# Patient Record
Sex: FEMALE | Race: WHITE | NOT HISPANIC OR LATINO | Employment: FULL TIME | URBAN - METROPOLITAN AREA
[De-identification: names, ages, dates, MRNs, and addresses within clinical notes are randomized per-mention and may not be internally consistent; named-entity substitution may affect disease eponyms.]

---

## 2018-02-14 ENCOUNTER — APPOINTMENT (OUTPATIENT)
Dept: GENERAL RADIOLOGY | Facility: HOSPITAL | Age: 58
End: 2018-02-14

## 2018-02-14 ENCOUNTER — HOSPITAL ENCOUNTER (INPATIENT)
Facility: HOSPITAL | Age: 58
LOS: 3 days | Discharge: HOME | End: 2018-02-17
Attending: EMERGENCY MEDICINE | Admitting: INTERNAL MEDICINE

## 2018-02-14 DIAGNOSIS — J40 BRONCHITIS: ICD-10-CM

## 2018-02-14 DIAGNOSIS — R06.2 WHEEZES: ICD-10-CM

## 2018-02-14 DIAGNOSIS — R09.02 HYPOXEMIA: ICD-10-CM

## 2018-02-14 DIAGNOSIS — J18.9 COMMUNITY ACQUIRED PNEUMONIA OF RIGHT LOWER LOBE OF LUNG: ICD-10-CM

## 2018-02-14 DIAGNOSIS — J44.1 CHRONIC OBSTRUCTIVE PULMONARY DISEASE WITH ACUTE EXACERBATION (HCC): ICD-10-CM

## 2018-02-14 DIAGNOSIS — J96.01 ACUTE RESPIRATORY FAILURE WITH HYPOXIA (HCC): ICD-10-CM

## 2018-02-14 DIAGNOSIS — R06.02 SHORTNESS OF BREATH: Primary | ICD-10-CM

## 2018-02-14 DIAGNOSIS — Z72.0 TOBACCO ABUSE: ICD-10-CM

## 2018-02-14 LAB
ALBUMIN SERPL-MCNC: 4.6 G/DL (ref 3.2–4.8)
ALBUMIN/GLOB SERPL: 1.4 G/DL (ref 1.5–2.5)
ALP SERPL-CCNC: 80 U/L (ref 25–100)
ALT SERPL W P-5'-P-CCNC: 33 U/L (ref 7–40)
ANION GAP SERPL CALCULATED.3IONS-SCNC: 7 MMOL/L (ref 3–11)
ARTERIAL PATENCY WRIST A: ABNORMAL
AST SERPL-CCNC: 27 U/L (ref 0–33)
ATMOSPHERIC PRESS: ABNORMAL MMHG
BASE EXCESS BLDA CALC-SCNC: 0.2 MMOL/L (ref 0–2)
BASOPHILS # BLD AUTO: 0.02 10*3/MM3 (ref 0–0.2)
BASOPHILS NFR BLD AUTO: 0.2 % (ref 0–1)
BDY SITE: ABNORMAL
BILIRUB SERPL-MCNC: 0.3 MG/DL (ref 0.3–1.2)
BUN BLD-MCNC: 11 MG/DL (ref 9–23)
BUN/CREAT SERPL: 13.8 (ref 7–25)
CALCIUM SPEC-SCNC: 9.8 MG/DL (ref 8.7–10.4)
CHLORIDE SERPL-SCNC: 102 MMOL/L (ref 99–109)
CO2 BLDA-SCNC: 24.1 MMOL/L (ref 22–33)
CO2 SERPL-SCNC: 24 MMOL/L (ref 20–31)
COHGB MFR BLD: 1.6 % (ref 0–2)
CREAT BLD-MCNC: 0.8 MG/DL (ref 0.6–1.3)
DEPRECATED RDW RBC AUTO: 53.2 FL (ref 37–54)
EOSINOPHIL # BLD AUTO: 0.04 10*3/MM3 (ref 0–0.3)
EOSINOPHIL NFR BLD AUTO: 0.4 % (ref 0–3)
ERYTHROCYTE [DISTWIDTH] IN BLOOD BY AUTOMATED COUNT: 15.5 % (ref 11.3–14.5)
FLUAV AG NPH QL: NEGATIVE
FLUAV SUBTYP SPEC NAA+PROBE: NOT DETECTED
FLUBV AG NPH QL IA: NEGATIVE
FLUBV RNA ISLT QL NAA+PROBE: NOT DETECTED
GFR SERPL CREATININE-BSD FRML MDRD: 74 ML/MIN/1.73
GLOBULIN UR ELPH-MCNC: 3.4 GM/DL
GLUCOSE BLD-MCNC: 121 MG/DL (ref 70–100)
GLUCOSE BLDC GLUCOMTR-MCNC: 253 MG/DL (ref 70–130)
HCO3 BLDA-SCNC: 23.2 MMOL/L (ref 20–26)
HCT VFR BLD AUTO: 42.8 % (ref 34.5–44)
HCT VFR BLD CALC: 42.3 %
HGB BLD-MCNC: 14.4 G/DL (ref 11.5–15.5)
HGB BLDA-MCNC: 13.8 G/DL (ref 14–18)
HOLD SPECIMEN: NORMAL
HOLD SPECIMEN: NORMAL
HOROWITZ INDEX BLD+IHG-RTO: 21 %
IMM GRANULOCYTES # BLD: 0.05 10*3/MM3 (ref 0–0.03)
IMM GRANULOCYTES NFR BLD: 0.6 % (ref 0–0.6)
LYMPHOCYTES # BLD AUTO: 0.67 10*3/MM3 (ref 0.6–4.8)
LYMPHOCYTES NFR BLD AUTO: 7.5 % (ref 24–44)
MCH RBC QN AUTO: 31.6 PG (ref 27–31)
MCHC RBC AUTO-ENTMCNC: 33.6 G/DL (ref 32–36)
MCV RBC AUTO: 93.9 FL (ref 80–99)
METHGB BLD QL: 0.7 % (ref 0–1.5)
MODALITY: ABNORMAL
MONOCYTES # BLD AUTO: 0.74 10*3/MM3 (ref 0–1)
MONOCYTES NFR BLD AUTO: 8.3 % (ref 0–12)
NEUTROPHILS # BLD AUTO: 7.4 10*3/MM3 (ref 1.5–8.3)
NEUTROPHILS NFR BLD AUTO: 83 % (ref 41–71)
OXYHGB MFR BLDV: 87.4 % (ref 94–99)
PCO2 BLDA: 31.9 MM HG (ref 35–45)
PH BLDA: 7.47 PH UNITS (ref 7.35–7.45)
PLATELET # BLD AUTO: 163 10*3/MM3 (ref 150–450)
PMV BLD AUTO: 10.7 FL (ref 6–12)
PO2 BLDA: 53.1 MM HG (ref 83–108)
POTASSIUM BLD-SCNC: 4.3 MMOL/L (ref 3.5–5.5)
PROT SERPL-MCNC: 8 G/DL (ref 5.7–8.2)
RBC # BLD AUTO: 4.56 10*6/MM3 (ref 3.89–5.14)
SAO2 % BLDCOA: 87 %
SODIUM BLD-SCNC: 133 MMOL/L (ref 132–146)
WBC NRBC COR # BLD: 8.92 10*3/MM3 (ref 3.5–10.8)
WHOLE BLOOD HOLD SPECIMEN: NORMAL
WHOLE BLOOD HOLD SPECIMEN: NORMAL

## 2018-02-14 PROCEDURE — 94799 UNLISTED PULMONARY SVC/PX: CPT

## 2018-02-14 PROCEDURE — 99223 1ST HOSP IP/OBS HIGH 75: CPT | Performed by: INTERNAL MEDICINE

## 2018-02-14 PROCEDURE — 87804 INFLUENZA ASSAY W/OPTIC: CPT | Performed by: PHYSICIAN ASSISTANT

## 2018-02-14 PROCEDURE — 25010000002 METHYLPREDNISOLONE PER 125 MG: Performed by: PHYSICIAN ASSISTANT

## 2018-02-14 PROCEDURE — 80053 COMPREHEN METABOLIC PANEL: CPT | Performed by: EMERGENCY MEDICINE

## 2018-02-14 PROCEDURE — 25010000002 KETOROLAC TROMETHAMINE PER 15 MG: Performed by: PHYSICIAN ASSISTANT

## 2018-02-14 PROCEDURE — 82962 GLUCOSE BLOOD TEST: CPT

## 2018-02-14 PROCEDURE — 71045 X-RAY EXAM CHEST 1 VIEW: CPT

## 2018-02-14 PROCEDURE — 25010000002 ENOXAPARIN PER 10 MG: Performed by: INTERNAL MEDICINE

## 2018-02-14 PROCEDURE — 87502 INFLUENZA DNA AMP PROBE: CPT | Performed by: PHYSICIAN ASSISTANT

## 2018-02-14 PROCEDURE — 85025 COMPLETE CBC W/AUTO DIFF WBC: CPT | Performed by: EMERGENCY MEDICINE

## 2018-02-14 PROCEDURE — 94640 AIRWAY INHALATION TREATMENT: CPT

## 2018-02-14 PROCEDURE — 82805 BLOOD GASES W/O2 SATURATION: CPT | Performed by: PHYSICIAN ASSISTANT

## 2018-02-14 PROCEDURE — 94760 N-INVAS EAR/PLS OXIMETRY 1: CPT

## 2018-02-14 PROCEDURE — 99285 EMERGENCY DEPT VISIT HI MDM: CPT

## 2018-02-14 PROCEDURE — 36600 WITHDRAWAL OF ARTERIAL BLOOD: CPT | Performed by: PHYSICIAN ASSISTANT

## 2018-02-14 RX ORDER — KETOROLAC TROMETHAMINE 30 MG/ML
15 INJECTION, SOLUTION INTRAMUSCULAR; INTRAVENOUS ONCE
Status: COMPLETED | OUTPATIENT
Start: 2018-02-14 | End: 2018-02-14

## 2018-02-14 RX ORDER — IPRATROPIUM BROMIDE AND ALBUTEROL SULFATE 2.5; .5 MG/3ML; MG/3ML
3 SOLUTION RESPIRATORY (INHALATION) EVERY 4 HOURS PRN
Status: DISCONTINUED | OUTPATIENT
Start: 2018-02-14 | End: 2018-02-17 | Stop reason: HOSPADM

## 2018-02-14 RX ORDER — IPRATROPIUM BROMIDE AND ALBUTEROL SULFATE 2.5; .5 MG/3ML; MG/3ML
3 SOLUTION RESPIRATORY (INHALATION)
Status: DISCONTINUED | OUTPATIENT
Start: 2018-02-14 | End: 2018-02-17 | Stop reason: HOSPADM

## 2018-02-14 RX ORDER — BENZONATATE 100 MG/1
200 CAPSULE ORAL 3 TIMES DAILY PRN
Status: DISCONTINUED | OUTPATIENT
Start: 2018-02-14 | End: 2018-02-17 | Stop reason: HOSPADM

## 2018-02-14 RX ORDER — ALBUTEROL SULFATE 2.5 MG/3ML
2.5 SOLUTION RESPIRATORY (INHALATION) ONCE
Status: COMPLETED | OUTPATIENT
Start: 2018-02-14 | End: 2018-02-14

## 2018-02-14 RX ORDER — METHYLPREDNISOLONE SODIUM SUCCINATE 125 MG/2ML
125 INJECTION, POWDER, LYOPHILIZED, FOR SOLUTION INTRAMUSCULAR; INTRAVENOUS ONCE
Status: COMPLETED | OUTPATIENT
Start: 2018-02-14 | End: 2018-02-14

## 2018-02-14 RX ORDER — SODIUM CHLORIDE 0.9 % (FLUSH) 0.9 %
10 SYRINGE (ML) INJECTION AS NEEDED
Status: DISCONTINUED | OUTPATIENT
Start: 2018-02-14 | End: 2018-02-17 | Stop reason: HOSPADM

## 2018-02-14 RX ORDER — ACETAMINOPHEN 325 MG/1
650 TABLET ORAL EVERY 6 HOURS PRN
Status: DISCONTINUED | OUTPATIENT
Start: 2018-02-14 | End: 2018-02-17 | Stop reason: HOSPADM

## 2018-02-14 RX ORDER — METHYLPREDNISOLONE SODIUM SUCCINATE 40 MG/ML
60 INJECTION, POWDER, LYOPHILIZED, FOR SOLUTION INTRAMUSCULAR; INTRAVENOUS EVERY 12 HOURS
Status: DISCONTINUED | OUTPATIENT
Start: 2018-02-15 | End: 2018-02-16

## 2018-02-14 RX ORDER — SODIUM CHLORIDE 0.9 % (FLUSH) 0.9 %
1-10 SYRINGE (ML) INJECTION AS NEEDED
Status: DISCONTINUED | OUTPATIENT
Start: 2018-02-14 | End: 2018-02-17 | Stop reason: HOSPADM

## 2018-02-14 RX ORDER — IPRATROPIUM BROMIDE AND ALBUTEROL SULFATE 2.5; .5 MG/3ML; MG/3ML
3 SOLUTION RESPIRATORY (INHALATION) ONCE
Status: COMPLETED | OUTPATIENT
Start: 2018-02-14 | End: 2018-02-14

## 2018-02-14 RX ADMIN — IPRATROPIUM BROMIDE AND ALBUTEROL SULFATE 3 ML: .5; 3 SOLUTION RESPIRATORY (INHALATION) at 19:26

## 2018-02-14 RX ADMIN — IPRATROPIUM BROMIDE AND ALBUTEROL SULFATE 3 ML: .5; 3 SOLUTION RESPIRATORY (INHALATION) at 09:34

## 2018-02-14 RX ADMIN — ALBUTEROL SULFATE 2.5 MG: 2.5 SOLUTION RESPIRATORY (INHALATION) at 10:13

## 2018-02-14 RX ADMIN — BENZONATATE 200 MG: 100 CAPSULE, LIQUID FILLED ORAL at 23:08

## 2018-02-14 RX ADMIN — METHYLPREDNISOLONE SODIUM SUCCINATE 60 MG: 40 INJECTION, POWDER, FOR SOLUTION INTRAMUSCULAR; INTRAVENOUS at 23:08

## 2018-02-14 RX ADMIN — ACETAMINOPHEN 650 MG: 325 TABLET, FILM COATED ORAL at 17:09

## 2018-02-14 RX ADMIN — IPRATROPIUM BROMIDE AND ALBUTEROL SULFATE 3 ML: .5; 3 SOLUTION RESPIRATORY (INHALATION) at 15:05

## 2018-02-14 RX ADMIN — ENOXAPARIN SODIUM 40 MG: 40 INJECTION SUBCUTANEOUS at 14:42

## 2018-02-14 RX ADMIN — ACETAMINOPHEN 650 MG: 325 TABLET, FILM COATED ORAL at 23:08

## 2018-02-14 RX ADMIN — KETOROLAC TROMETHAMINE 15 MG: 30 INJECTION, SOLUTION INTRAMUSCULAR at 11:26

## 2018-02-14 RX ADMIN — METHYLPREDNISOLONE SODIUM SUCCINATE 125 MG: 125 INJECTION, POWDER, FOR SOLUTION INTRAMUSCULAR; INTRAVENOUS at 10:25

## 2018-02-14 NOTE — PAYOR COMM NOTE
"Anita Reyes (57 y.o. Female)     Date of Birth Social Security Number Address Home Phone MRN    1960  [address] [phone] [id number]    Gnosticism Marital Status          None Single       Admission Date Admission Type Admitting Provider Attending Provider Department, Room/Bed    18 Emergency Calixto Griffith MD Opii, [place], MD [facility] 3E, S337/1    Discharge Date Discharge Disposition Discharge Destination                      Attending Provider: Calixto Griffith MD     Allergies:  Sulfa Antibiotics    Isolation:  None   Infection:  None   Code Status:  FULL    Ht:  160 cm (63\")   Wt:  72.1 kg (159 lb)    Admission Cmt:  None   Principal Problem:  Acute respiratory failure with hypoxia [J96.01]                 Active Insurance as of 2018     Primary Coverage     Payor Plan Insurance Group Employer/Plan Group    [employer] [id number]     Payor Plan Address Payor Plan Phone Number Effective From Effective To    [address] [phone] 2015     [place] [address]       Subscriber Name Subscriber Birth Date Member ID       ANITA REYES 1960 [id number]                 Emergency Contacts      (Rel.) Home Phone Work Phone Mobile Phone    Valerie Reyes (Daughter) [phone] -- --            Insurance Information                [facility] [employer] Phone: [phone]    Subscriber: Anita Reyes Subscriber#: [id number]    Group#: [id number] Precert#:              History & Physical      Calixto Griffith MD at 2018 12:56 PM              [facility]  HISTORY AND PHYSICAL    Patient Name: Anita Reyes  : 1960  MRN: [id number]  Primary Care Physician: No Known Provider    Subjective   Subjective     Chief Complaint: General weakness , aches and SOA    HPI:  Anita Reyes is a 57 y.o. female with her " extensive smoking history, presents with 1 week of generalized body aches, weakness, nausea with no vomiting, and progressive shortness of breath.  Patient works at the [employer] and she mentions that she has been in contact with multiple sick patients.  On arrival here patient is hemodynamically stable, oxygen levels low ABG done that reveals hypoxia.  Patient is status post duo nebs and steroids with marginal improvement, as such Hospital medicine was consulted for admission.  On evaluation patient was working hard to breathe, was restless, did endorse a cough and shortness of air, nausea but no vomiting.    Review of Systems   Gen- No fevers, chills  CV- No chest pain, palpitations  Resp- No cough, dyspnea  GI- +nausea no V/D, abd pain    Otherwise 10-system ROS reviewed and is negative except as mentioned in the HPI.    Personal History     History reviewed. No pertinent past medical history.    Past Surgical History:   Procedure Laterality Date   • TUBAL ABDOMINAL LIGATION         Family History: Lung cancer on further    Social History:  reports that she has been smoking Cigarettes.  She has been smoking about 1.00 pack per day. She does not have any smokeless tobacco history on file. She reports that she drinks alcohol. She reports that she uses illicit drugs.  Social History     Social History Narrative   • No narrative on file       Medications:  No prescriptions prior to admission.       Allergies   Allergen Reactions   • Sulfa Antibiotics Itching       Objective   Objective     Vital Signs:   Temp:  [98.1 °F (36.7 °C)-99.3 °F (37.4 °C)] 99.3 °F (37.4 °C)  Heart Rate:  [] 98  Resp:  [22] 22  BP: (110-141)/(51-82) 121/51        Physical Exam   Constitutional: No acute distress, awake, alert, uncomdortable  Eyes: PERRLA, sclerae anicteric, no conjunctival injection  HENT: NCAT, mucous membranes moist  Neck: Supple, no thyromegaly, no lymphadenopathy, trachea midline  Respiratory: labored respirations, coarse  BS, diffuse, exp wheezes   Cardiovascular: RRR, no murmurs, rubs, or gallops, palpable pedal pulses bilaterally  Gastrointestinal: Positive bowel sounds, soft, nontender, nondistended  Musculoskeletal: No bilateral ankle edema, no clubbing or cyanosis to extremities  Psychiatric: Appropriate affect, cooperative  Neurologic: Oriented x 3, strength symmetric in all extremities, Cranial Nerves grossly intact to confrontation, speech clear  Skin: No rashes    Results Reviewed:  I have personally reviewed current lab, radiology, and data and agree.      Results from last 7 days  Lab Units 02/14/18  0920   WBC 10*3/mm3 8.92   HEMOGLOBIN g/dL 14.4   HEMATOCRIT % 42.8   PLATELETS 10*3/mm3 163       Results from last 7 days  Lab Units 02/14/18  0920   SODIUM mmol/L 133   POTASSIUM mmol/L 4.3   CHLORIDE mmol/L 102   CO2 mmol/L 24.0   BUN mg/dL 11   CREATININE mg/dL 0.80   GLUCOSE mg/dL 121*   CALCIUM mg/dL 9.8   ALT (SGPT) U/L 33   AST (SGOT) U/L 27     Estimated Creatinine Clearance: 73.5 mL/min (by C-G formula based on Cr of 0.8).  Brief Urine Lab Results     None        No results found for: BNP  pH, Arterial   Date Value Ref Range Status   02/14/2018 7.469 (H) 7.350 - 7.450 pH units Final     Imaging Results (last 24 hours)     Procedure Component Value Units Date/Time    XR Chest 1 View [[id number]] Collected:  02/14/18 1048     Updated:  02/14/18 1155    Narrative:       EXAMINATION: XR CHEST 1 VW-      INDICATION: Simple sepsis triage protocol.      COMPARISON: None.     FINDINGS:   1. There is ill-defined airspace opacity right lung base. Although it is  not densely consolidated but appears acute and active. There is  increased density and prominence to the markings in the right hilum.  2. The left lung is clear. The heart is normal. There is no free fluid.            Impression:       Airspace opacity right hilum perihilar area and right lung  base, likely acute inflammatory process. Otherwise, the left lung is  clear  and there is no free fluid.     D:  02/14/2018  E:  02/14/2018     This report was finalized on 2/14/2018 11:53 AM by Dr. Raji Chamberlain MD.                Assessment/Plan   Assessment / Plan     Hospital Problem List     * (Principal)Acute respiratory failure with hypoxia    Chronic obstructive pulmonary disease with acute exacerbation        57-year-old female with extensive smoking history, presents with 1 week of generalized body aches, nausea no vomiting and increasing shortness of breath.  Admitted for suspected COPD exacerbation    Assessment & Plan:  -Acute respiratory failure with hypoxia etiology unknown however patient smoking history suspect underlying COPD with exacerbation, continue steroids, we'll, O2 supplementation wean as able.  -Generalized aches with malaise suspected flu, antigen negative follow-up influenza PCR    DVT prophylaxis: Lovenox    CODE STATUS:  No Order    Admission Status:  I believe this patient meets INPATIENT status due to the need for care which can only be reasonably provided in an hospital setting such as aggressive/expedited ancillary services and/or consultation services, the necessity for IV medications, close physician monitoring and/or the possible need for procedures.  In such, I feel patient’s risk for adverse outcomes and need for care warrant INPATIENT evaluation and predict the patient’s care encounter to likely last beyond 2 midnights.    Electronically signed by Calixto Griffith MD, 02/14/18, 12:56 PM.         Electronically signed by Calixto Griffith MD at 2/14/2018  2:31 PM           Emergency Department Notes      MARC Ortiz at 2/14/2018  9:10 AM          Subjective   HPI Comments: 57-year-old female presents to the emergency department with complaints of shortness of breath, wheezing and cough.  Symptoms began 4 days ago.  She has had a subjective fever.  No nausea or vomiting.  She notes some dark urine.  No urinary symptoms.  The patient smokes a half  "pack cigarettes daily.  She occasionally drinks alcohol.  No drug use.  No known health issues.  She has no PCP.    Patient is a 57 y.o. female presenting with URI.   History provided by:  Patient  URI   Presenting symptoms: congestion, cough, fatigue and fever (subjective)    Presenting symptoms: no ear pain, no rhinorrhea and no sore throat    Severity:  Moderate  Onset quality:  Gradual  Duration:  4 days  Timing:  Constant  Progression:  Worsening  Chronicity:  New  Relieved by:  Nothing  Worsened by:  Nothing  Associated symptoms: wheezing    Associated symptoms: no arthralgias, no headaches and no myalgias    Risk factors: no chronic respiratory disease, no recent travel and no sick contacts        Review of Systems   Constitutional: Positive for chills, fatigue and fever (subjective).   HENT: Positive for congestion. Negative for ear pain, nosebleeds, rhinorrhea and sore throat.    Eyes: Negative for pain, discharge and visual disturbance.   Respiratory: Positive for cough, shortness of breath and wheezing.    Cardiovascular: Positive for chest pain (\"soreness with coughing\"). Negative for palpitations and leg swelling.   Gastrointestinal: Negative for abdominal pain, blood in stool, diarrhea, nausea and vomiting.   Endocrine: Negative.    Genitourinary: Negative for dysuria, hematuria and urgency.   Musculoskeletal: Negative for arthralgias, back pain and myalgias.   Skin: Negative for pallor and rash.   Allergic/Immunologic: Negative for immunocompromised state.   Neurological: Negative for dizziness, speech difficulty, weakness and headaches.   Hematological: Negative for adenopathy. Does not bruise/bleed easily.   Psychiatric/Behavioral: Negative.        History reviewed. No pertinent past medical history.    Allergies   Allergen Reactions   • Sulfa Antibiotics Itching       Past Surgical History:   Procedure Laterality Date   • TUBAL ABDOMINAL LIGATION         History reviewed. No pertinent family " history.    Social History     Social History   • Marital status: Single     Spouse name: N/A   • Number of children: N/A   • Years of education: N/A     Social History Main Topics   • Smoking status: Current Every Day Smoker     Packs/day: 1.00     Types: Cigarettes   • Smokeless tobacco: None   • Alcohol use Yes   • Drug use: Yes   • Sexual activity: Defer     Other Topics Concern   • None     Social History Narrative   • None           Objective   Physical Exam   Constitutional: She is oriented to person, place, and time. She appears well-developed and well-nourished. She appears distressed (appears to feel bad).   HENT:   Head: Normocephalic and atraumatic.   Nose: Nose normal.   Mouth/Throat: Oropharynx is clear and moist.   Eyes: Conjunctivae and EOM are normal. Pupils are equal, round, and reactive to light. No scleral icterus.   Neck: Normal range of motion. Neck supple.   Cardiovascular: Regular rhythm and normal heart sounds.    No murmur heard.  Mildly tachycardic   Pulmonary/Chest: She is in respiratory distress. She has wheezes. She has no rales. She exhibits no tenderness.   Abdominal: Soft. Bowel sounds are normal. There is no tenderness.   Musculoskeletal: Normal range of motion. She exhibits no edema or tenderness.   Neurological: She is alert and oriented to person, place, and time.   Skin: Skin is warm and dry. No rash noted. She is not diaphoretic.   Psychiatric: She has a normal mood and affect.   Nursing note and vitals reviewed.      Procedures        ED Course  ED Course    12:21 PM  The patient has had little to no relief of her shortness of breath and wheezing after 2 neb treatments and a dose of Solu-Medrol.  Chest x-ray shows some inflammatory changes but no consolidation is seen by the radiologist.  Influenza screen was negative.  A PCR flu test has been collected as well and is pending.  A blood gas collected after her second neb treatment shows her to be hypoxic with a PO2 of 53, PCO2  of 31 and a pH of 7.46.  White count is normal at 8.9 normal H&H.  Normal chemistries.  On last recheck, the patient is still wheezing quite a bit.  Her O2 sats with 2 L is 92%.  I have paged the hospitalist for admission.  Recent Results (from the past 24 hour(s))   Influenza Antigen, Rapid - Swab, Nasopharynx    Collection Time: 02/14/18  9:12 AM   Result Value Ref Range    Influenza A Ag, EIA Negative Negative    Influenza B Ag, EIA Negative Negative   Comprehensive Metabolic Panel    Collection Time: 02/14/18  9:20 AM   Result Value Ref Range    Glucose 121 (H) 70 - 100 mg/dL    BUN 11 9 - 23 mg/dL    Creatinine 0.80 0.60 - 1.30 mg/dL    Sodium 133 132 - 146 mmol/L    Potassium 4.3 3.5 - 5.5 mmol/L    Chloride 102 99 - 109 mmol/L    CO2 24.0 20.0 - 31.0 mmol/L    Calcium 9.8 8.7 - 10.4 mg/dL    Total Protein 8.0 5.7 - 8.2 g/dL    Albumin 4.60 3.20 - 4.80 g/dL    ALT (SGPT) 33 7 - 40 U/L    AST (SGOT) 27 0 - 33 U/L    Alkaline Phosphatase 80 25 - 100 U/L    Total Bilirubin 0.3 0.3 - 1.2 mg/dL    eGFR Non African Amer 74 >60 mL/min/1.73    Globulin 3.4 gm/dL    A/G Ratio 1.4 (L) 1.5 - 2.5 g/dL    BUN/Creatinine Ratio 13.8 7.0 - 25.0    Anion Gap 7.0 3.0 - 11.0 mmol/L   CBC Auto Differential    Collection Time: 02/14/18  9:20 AM   Result Value Ref Range    WBC 8.92 3.50 - 10.80 10*3/mm3    RBC 4.56 3.89 - 5.14 10*6/mm3    Hemoglobin 14.4 11.5 - 15.5 g/dL    Hematocrit 42.8 34.5 - 44.0 %    MCV 93.9 80.0 - 99.0 fL    MCH 31.6 (H) 27.0 - 31.0 pg    MCHC 33.6 32.0 - 36.0 g/dL    RDW 15.5 (H) 11.3 - 14.5 %    RDW-SD 53.2 37.0 - 54.0 fl    MPV 10.7 6.0 - 12.0 fL    Platelets 163 150 - 450 10*3/mm3    Neutrophil % 83.0 (H) 41.0 - 71.0 %    Lymphocyte % 7.5 (L) 24.0 - 44.0 %    Monocyte % 8.3 0.0 - 12.0 %    Eosinophil % 0.4 0.0 - 3.0 %    Basophil % 0.2 0.0 - 1.0 %    Immature Grans % 0.6 0.0 - 0.6 %    Neutrophils, Absolute 7.40 1.50 - 8.30 10*3/mm3    Lymphocytes, Absolute 0.67 0.60 - 4.80 10*3/mm3    Monocytes,  Absolute 0.74 0.00 - 1.00 10*3/mm3    Eosinophils, Absolute 0.04 0.00 - 0.30 10*3/mm3    Basophils, Absolute 0.02 0.00 - 0.20 10*3/mm3    Immature Grans, Absolute 0.05 (H) 0.00 - 0.03 10*3/mm3   Light Blue Top    Collection Time: 02/14/18  9:20 AM   Result Value Ref Range    Extra Tube hold for add-on    Green Top (Gel)    Collection Time: 02/14/18  9:20 AM   Result Value Ref Range    Extra Tube Hold for add-ons.    Lavender Top    Collection Time: 02/14/18  9:20 AM   Result Value Ref Range    Extra Tube hold for add-on    Gold Top - SST    Collection Time: 02/14/18  9:20 AM   Result Value Ref Range    Extra Tube Hold for add-ons.    Blood Gas, Arterial    Collection Time: 02/14/18 11:11 AM   Result Value Ref Range    Site Arterial: right radial     Erwin's Test N/A     pH, Arterial 7.469 (H) 7.350 - 7.450 pH units    pCO2, Arterial 31.9 (L) 35.0 - 45.0 mm Hg    pO2, Arterial 53.1 (L) 83.0 - 108.0 mm Hg    HCO3, Arterial 23.2 20.0 - 26.0 mmol/L    Base Excess, Arterial 0.2 0.0 - 2.0 mmol/L    O2 Saturation, Arterial 87.0 %    Hemoglobin, Blood Gas 13.8 (L) 14 - 18 g/dL    Hematocrit, Blood Gas 42.3 %    Oxyhemoglobin 87.4 (L) 94 - 99 %    Methemoglobin 0.70 0.00 - 1.50 %    Carboxyhemoglobin 1.6 0 - 2 %    CO2 Content 24.1 22 - 33    Barometric Pressure for Blood Gas  mmHg    Modality Room Air     FIO2 21 %     Note: In addition to lab results from this visit, the labs listed above may include labs taken at another facility or during a different encounter within the last 24 hours. Please correlate lab times with ED admission and discharge times for further clarification of the services performed during this visit.    XR Chest 1 View   Final Result   Airspace opacity right hilum perihilar area and right lung   base, likely acute inflammatory process. Otherwise, the left lung is   clear and there is no free fluid.       D:  02/14/2018   E:  02/14/2018       This report was finalized on 2/14/2018 11:53 AM by Dr. Alegria  MD Bulmaro.            Vitals:    02/14/18 1030 02/14/18 1100 02/14/18 1130 02/14/18 1200   BP: 121/61 141/68  110/57   BP Location:       Patient Position:       Pulse: 105 111 107 105   Resp:       Temp:       TempSrc:       SpO2: 93% 90% 93% 93%   Weight:       Height:         Medications   sodium chloride 0.9 % flush 10 mL (not administered)   ipratropium-albuterol (DUO-NEB) nebulizer solution 3 mL (3 mL Nebulization Given 2/14/18 0934)   albuterol (PROVENTIL) nebulizer solution 0.083% 2.5 mg/3mL (2.5 mg Nebulization Given 2/14/18 1013)   methylPREDNISolone sodium succinate (SOLU-Medrol) injection 125 mg (125 mg Intravenous Given 2/14/18 1025)   ketorolac (TORADOL) injection 15 mg (15 mg Intravenous Given 2/14/18 1126)     ECG/EMG Results (last 24 hours)     ** No results found for the last 24 hours. **                      [facility]    Final diagnoses:   Shortness of breath   Hypoxemia   Wheezes   Bronchitis            MARC Ortiz  02/14/18 1221       Electronically signed by MARC Ortiz at 2/14/2018 12:21 PM          Vital Signs (last 24 hours)       02/13 0700  -  02/14 0659 02/14 0700  -  02/14 1506   Most Recent    Temp (°F)   98.1 -  100.1     100.1 (37.8)    Heart Rate   84 -  120     84    Resp   20 -  22     20    BP   110/57 -  141/68     127/61    SpO2 (%)   90 -  95     93          Intake & Output (last day)     None        Lines, Drains & Airways    Active LDAs     Name:   Placement date:   Placement time:   Site:   Days:    Peripheral IV Line - Single Lumen 02/14/18 0909  20 gauge  02/14/18    0909      less than 1                Hospital Medications (all)       Dose Frequency Start End    albuterol (PROVENTIL) nebulizer solution 0.083% 2.5 mg/3mL 2.5 mg Once 2/14/2018 2/14/2018    Sig - Route: Take 2.5 mg by nebulization 1 (One) Time. - Nebulization    benzonatate (TESSALON) capsule 200 mg 200 mg 3 Times Daily PRN 2/14/2018     Sig - Route: Take 2 capsules by mouth 3 (Three) Times a  Day As Needed for Cough. - Oral    enoxaparin (LOVENOX) syringe 40 mg 40 mg Every 24 Hours 2/14/2018     Sig - Route: Inject 0.4 mL under the skin Daily. - Subcutaneous    ipratropium-albuterol (DUO-NEB) nebulizer solution 3 mL 3 mL Once 2/14/2018 2/14/2018    Sig - Route: Take 3 mL by nebulization 1 (One) Time. - Nebulization    ipratropium-albuterol (DUO-NEB) nebulizer solution 3 mL 3 mL Every 4 Hours - RT 2/14/2018     Sig - Route: Take 3 mL by nebulization Every 4 (Four) Hours. - Nebulization    ipratropium-albuterol (DUO-NEB) nebulizer solution 3 mL 3 mL Every 4 Hours PRN 2/14/2018     Sig - Route: Take 3 mL by nebulization Every 4 (Four) Hours As Needed for Shortness of Air. - Nebulization    ketorolac (TORADOL) injection 15 mg 15 mg Once 2/14/2018 2/14/2018    Sig - Route: Infuse 15 mg into a venous catheter 1 (One) Time. - Intravenous    methylPREDNISolone sodium succinate (SOLU-Medrol) injection 125 mg 125 mg Once 2/14/2018 2/14/2018    Sig - Route: Infuse 2 mL into a venous catheter 1 (One) Time. - Intravenous    methylPREDNISolone sodium succinate (SOLU-Medrol) injection 60 mg 60 mg Every 12 Hours 2/15/2018     Sig - Route: Infuse 1.5 mL into a venous catheter Every 12 (Twelve) Hours. - Intravenous    sodium chloride 0.9 % flush 1-10 mL 1-10 mL As Needed 2/14/2018     Sig - Route: Infuse 1-10 mL into a venous catheter As Needed for Line Care. - Intravenous    sodium chloride 0.9 % flush 10 mL 10 mL As Needed 2/14/2018     Sig - Route: Infuse 10 mL into a venous catheter As Needed for Line Care. - Intravenous    Cosign for Ordering: Required by Deepak Lim MD          Blood Administration Record     None        Lab Results (all)     Procedure Component Value Units Date/Time    CBC & Differential [[id number]] Collected:  02/14/18 0920    Specimen:  Blood Updated:  02/14/18 0926    Narrative:       The following orders were created for panel order CBC & Differential.  Procedure                                Abnormality         Status                     ---------                               -----------         ------                     CBC Auto Differential[[id number]]        Abnormal            Final result                 Please view results for these tests on the individual orders.    CBC Auto Differential [[id number]]  (Abnormal) Collected:  02/14/18 0920    Specimen:  Blood Updated:  02/14/18 0926     WBC 8.92 10*3/mm3      RBC 4.56 10*6/mm3      Hemoglobin 14.4 g/dL      Hematocrit 42.8 %      MCV 93.9 fL      MCH 31.6 (H) pg      MCHC 33.6 g/dL      RDW 15.5 (H) %      RDW-SD 53.2 fl      MPV 10.7 fL      Platelets 163 10*3/mm3      Neutrophil % 83.0 (H) %      Lymphocyte % 7.5 (L) %      Monocyte % 8.3 %      Eosinophil % 0.4 %      Basophil % 0.2 %      Immature Grans % 0.6 %      Neutrophils, Absolute 7.40 10*3/mm3      Lymphocytes, Absolute 0.67 10*3/mm3      Monocytes, Absolute 0.74 10*3/mm3      Eosinophils, Absolute 0.04 10*3/mm3      Basophils, Absolute 0.02 10*3/mm3      Immature Grans, Absolute 0.05 (H) 10*3/mm3     Influenza Antigen, Rapid - Swab, Nasopharynx [[id number]]  (Normal) Collected:  02/14/18 0912    Specimen:  Swab from Nasopharynx Updated:  02/14/18 0933     Influenza A Ag, EIA Negative     Influenza B Ag, EIA Negative    Comprehensive Metabolic Panel [[id number]]  (Abnormal) Collected:  02/14/18 0920    Specimen:  Blood Updated:  02/14/18 0945     Glucose 121 (H) mg/dL      BUN 11 mg/dL      Creatinine 0.80 mg/dL      Sodium 133 mmol/L      Potassium 4.3 mmol/L      Chloride 102 mmol/L      CO2 24.0 mmol/L      Calcium 9.8 mg/dL      Total Protein 8.0 g/dL      Albumin 4.60 g/dL      ALT (SGPT) 33 U/L      AST (SGOT) 27 U/L      Alkaline Phosphatase 80 U/L      Total Bilirubin 0.3 mg/dL      eGFR Non African Amer 74 mL/min/1.73      Globulin 3.4 gm/dL      A/G Ratio 1.4 (L) g/dL      BUN/Creatinine Ratio 13.8     Anion Gap 7.0 mmol/L     Narrative:       [employer]  Guidelines    Stage     Description        GFR  1         Normal or High     90+  2         Mild decrease      60-89  3         Moderate decrease  30-59  4         Severe decrease    15-29  5         Kidney failure     <15    [facility] [[id number]] Collected:  02/14/18 0920    Specimen:  Blood Updated:  02/14/18 1031    Narrative:       The following orders were created for panel order [facility].  Procedure                               Abnormality         Status                     ---------                               -----------         ------                     Light Blue Top[[id number]]                                   Final result               Green Top (Gel)[[id number]]                                  Final result               Lavender Top[[id number]]                                     Final result               Gold Top - SST[[id number]]                                   Final result               Green Top (No Gel)[[id number]]                                                            Please view results for these tests on the individual orders.    Light Blue Top [[id number]] Collected:  02/14/18 0920    Specimen:  Blood Updated:  02/14/18 1031     Extra Tube hold for add-on      Auto resulted       Green Top (Gel) [[id number]] Collected:  02/14/18 0920    Specimen:  Blood Updated:  02/14/18 1031     Extra Tube Hold for add-ons.      Auto resulted.       Lavender Top [[id number]] Collected:  02/14/18 0920    Specimen:  Blood Updated:  02/14/18 1031     Extra Tube hold for add-on      Auto resulted       Gold Top - SST [[id number]] Collected:  02/14/18 0920    Specimen:  Blood Updated:  02/14/18 1031     Extra Tube Hold for add-ons.      Auto resulted.       Blood Gas, Arterial [[id number]]  (Abnormal) Collected:  02/14/18 1111    Specimen:  Arterial Blood Updated:  02/14/18 1123     Site Arterial: right radial     Erwin's Test N/A     pH, Arterial 7.469 (H) pH units      pCO2, Arterial 31.9 (L) mm Hg       pO2, Arterial 53.1 (L) mm Hg      HCO3, Arterial 23.2 mmol/L      Base Excess, Arterial 0.2 mmol/L      O2 Saturation, Arterial 87.0 %      Hemoglobin, Blood Gas 13.8 (L) g/dL      Hematocrit, Blood Gas 42.3 %      Oxyhemoglobin 87.4 (L) %      Methemoglobin 0.70 %      Carboxyhemoglobin 1.6 %      CO2 Content 24.1     Barometric Pressure for Blood Gas -- mmHg       N/A        Modality Room Air     FIO2 21 %     Influenza A & B, RT PCR - Swab, Nasopharynx [[id number]]  (Normal) Collected:  02/14/18 1128    Specimen:  Swab from Nasopharynx Updated:  02/14/18 1320     Influenza A PCR Not Detected     Influenza B PCR Not Detected    POC Glucose Once [[id number]]  (Abnormal) Collected:  02/14/18 1454    Specimen:  Blood Updated:  02/14/18 1458     Glucose 253 (H) mg/dL     Narrative:       Meter: [id number] : [id number] Rai          Imaging Results (all)     Procedure Component Value Units Date/Time    XR Chest 1 View [[id number]] Collected:  02/14/18 1048     Updated:  02/14/18 1155    Narrative:       EXAMINATION: XR CHEST 1 VW-      INDICATION: Simple sepsis triage protocol.      COMPARISON: None.     FINDINGS:   1. There is ill-defined airspace opacity right lung base. Although it is  not densely consolidated but appears acute and active. There is  increased density and prominence to the markings in the right hilum.  2. The left lung is clear. The heart is normal. There is no free fluid.            Impression:       Airspace opacity right hilum perihilar area and right lung  base, likely acute inflammatory process. Otherwise, the left lung is  clear and there is no free fluid.     D:  02/14/2018  E:  02/14/2018     This report was finalized on 2/14/2018 11:53 AM by Dr. Raji Chamberlain MD.             ECG/EMG Results (all)     None        Operative/Procedure Notes (all)     No notes of this type exist for this encounter.        Physician Progress Notes (all)     No notes of this type exist for this encounter.         Consult Notes (all)     No notes of this type exist for this encounter.

## 2018-02-14 NOTE — PLAN OF CARE
Problem: Respiratory Insufficiency (Adult)  Goal: Identify Related Risk Factors and Signs and Symptoms   02/14/18 1547   Respiratory Insufficiency   Related Risk Factors (Respiratory Insufficiency) smoking;knowledge deficit   Signs and Symptoms (Respiratory Insufficiency) abnormal ABGs;abnormal breath sounds;cough (productive/ineffective);diaphoresis;dyspnea;shortness of breath     Goal: Acid/Base Balance  Outcome: Ongoing (interventions implemented as appropriate)    Goal: Effective Ventilation  Outcome: Ongoing (interventions implemented as appropriate)

## 2018-02-14 NOTE — H&P
[facility] Medicine Services  HISTORY AND PHYSICAL    Patient Name: Micaela Reyes  : 1960  MRN: [id number]  Primary Care Physician: No Known Provider    Subjective   Subjective     Chief Complaint: General weakness , aches and SOA    HPI:  Micaela Reyes is a 57 y.o. female with her extensive smoking history, presents with 1 week of generalized body aches, weakness, nausea with no vomiting, and progressive shortness of breath.  Patient works at the [employer] and she mentions that she has been in contact with multiple sick patients.  On arrival here patient is hemodynamically stable, oxygen levels low ABG done that reveals hypoxia.  Patient is status post duo nebs and steroids with marginal improvement, as such Hospital medicine was consulted for admission.  On evaluation patient was working hard to breathe, was restless, did endorse a cough and shortness of air, nausea but no vomiting.    Review of Systems   Gen- No fevers, chills  CV- No chest pain, palpitations  Resp- No cough, dyspnea  GI- +nausea no V/D, abd pain    Otherwise 10-system ROS reviewed and is negative except as mentioned in the HPI.    Personal History     History reviewed. No pertinent past medical history.    Past Surgical History:   Procedure Laterality Date   • TUBAL ABDOMINAL LIGATION         Family History: Lung cancer on further    Social History:  reports that she has been smoking Cigarettes.  She has been smoking about 1.00 pack per day. She does not have any smokeless tobacco history on file. She reports that she drinks alcohol. She reports that she uses illicit drugs.  Social History     Social History Narrative   • No narrative on file       Medications:  No prescriptions prior to admission.       Allergies   Allergen Reactions   • Sulfa Antibiotics Itching       Objective   Objective     Vital Signs:   Temp:  [98.1 °F (36.7 °C)-99.3 °F (37.4 °C)] 99.3 °F (37.4 °C)  Heart Rate:  [] 98  Resp:  [22] 22  BP:  (110-141)/(51-82) 121/51        Physical Exam   Constitutional: No acute distress, awake, alert, uncomdortable  Eyes: PERRLA, sclerae anicteric, no conjunctival injection  HENT: NCAT, mucous membranes moist  Neck: Supple, no thyromegaly, no lymphadenopathy, trachea midline  Respiratory: labored respirations, coarse BS, diffuse, exp wheezes   Cardiovascular: RRR, no murmurs, rubs, or gallops, palpable pedal pulses bilaterally  Gastrointestinal: Positive bowel sounds, soft, nontender, nondistended  Musculoskeletal: No bilateral ankle edema, no clubbing or cyanosis to extremities  Psychiatric: Appropriate affect, cooperative  Neurologic: Oriented x 3, strength symmetric in all extremities, Cranial Nerves grossly intact to confrontation, speech clear  Skin: No rashes    Results Reviewed:  I have personally reviewed current lab, radiology, and data and agree.      Results from last 7 days  Lab Units 02/14/18  0920   WBC 10*3/mm3 8.92   HEMOGLOBIN g/dL 14.4   HEMATOCRIT % 42.8   PLATELETS 10*3/mm3 163       Results from last 7 days  Lab Units 02/14/18  0920   SODIUM mmol/L 133   POTASSIUM mmol/L 4.3   CHLORIDE mmol/L 102   CO2 mmol/L 24.0   BUN mg/dL 11   CREATININE mg/dL 0.80   GLUCOSE mg/dL 121*   CALCIUM mg/dL 9.8   ALT (SGPT) U/L 33   AST (SGOT) U/L 27     Estimated Creatinine Clearance: 73.5 mL/min (by C-G formula based on Cr of 0.8).  Brief Urine Lab Results     None        No results found for: BNP  pH, Arterial   Date Value Ref Range Status   02/14/2018 7.469 (H) 7.350 - 7.450 pH units Final     Imaging Results (last 24 hours)     Procedure Component Value Units Date/Time    XR Chest 1 View [[id number]] Collected:  02/14/18 1048     Updated:  02/14/18 1155    Narrative:       EXAMINATION: XR CHEST 1 VW-      INDICATION: Simple sepsis triage protocol.      COMPARISON: None.     FINDINGS:   1. There is ill-defined airspace opacity right lung base. Although it is  not densely consolidated but appears acute and active.  There is  increased density and prominence to the markings in the right hilum.  2. The left lung is clear. The heart is normal. There is no free fluid.            Impression:       Airspace opacity right hilum perihilar area and right lung  base, likely acute inflammatory process. Otherwise, the left lung is  clear and there is no free fluid.     D:  02/14/2018  E:  02/14/2018     This report was finalized on 2/14/2018 11:53 AM by Dr. Raji Chamberlain MD.                Assessment/Plan   Assessment / Plan     Hospital Problem List     * (Principal)Acute respiratory failure with hypoxia    Chronic obstructive pulmonary disease with acute exacerbation        57-year-old female with extensive smoking history, presents with 1 week of generalized body aches, nausea no vomiting and increasing shortness of breath.  Admitted for suspected COPD exacerbation    Assessment & Plan:  -Acute respiratory failure with hypoxia etiology unknown however patient smoking history suspect underlying COPD with exacerbation, continue steroids, we'll, O2 supplementation wean as able.  -Generalized aches with malaise suspected flu, antigen negative follow-up influenza PCR    DVT prophylaxis: Lovenox    CODE STATUS:  No Order    Admission Status:  I believe this patient meets INPATIENT status due to the need for care which can only be reasonably provided in an hospital setting such as aggressive/expedited ancillary services and/or consultation services, the necessity for IV medications, close physician monitoring and/or the possible need for procedures.  In such, I feel patient’s risk for adverse outcomes and need for care warrant INPATIENT evaluation and predict the patient’s care encounter to likely last beyond 2 midnights.    Electronically signed by Calixto Griffith MD, 02/14/18, 12:56 PM.

## 2018-02-14 NOTE — ED PROVIDER NOTES
"Subjective   HPI Comments: 57-year-old female presents to the emergency department with complaints of shortness of breath, wheezing and cough.  Symptoms began 4 days ago.  She has had a subjective fever.  No nausea or vomiting.  She notes some dark urine.  No urinary symptoms.  The patient smokes a half pack cigarettes daily.  She occasionally drinks alcohol.  No drug use.  No known health issues.  She has no PCP.    Patient is a 57 y.o. female presenting with URI.   History provided by:  Patient  URI   Presenting symptoms: congestion, cough, fatigue and fever (subjective)    Presenting symptoms: no ear pain, no rhinorrhea and no sore throat    Severity:  Moderate  Onset quality:  Gradual  Duration:  4 days  Timing:  Constant  Progression:  Worsening  Chronicity:  New  Relieved by:  Nothing  Worsened by:  Nothing  Associated symptoms: wheezing    Associated symptoms: no arthralgias, no headaches and no myalgias    Risk factors: no chronic respiratory disease, no recent travel and no sick contacts        Review of Systems   Constitutional: Positive for chills, fatigue and fever (subjective).   HENT: Positive for congestion. Negative for ear pain, nosebleeds, rhinorrhea and sore throat.    Eyes: Negative for pain, discharge and visual disturbance.   Respiratory: Positive for cough, shortness of breath and wheezing.    Cardiovascular: Positive for chest pain (\"soreness with coughing\"). Negative for palpitations and leg swelling.   Gastrointestinal: Negative for abdominal pain, blood in stool, diarrhea, nausea and vomiting.   Endocrine: Negative.    Genitourinary: Negative for dysuria, hematuria and urgency.   Musculoskeletal: Negative for arthralgias, back pain and myalgias.   Skin: Negative for pallor and rash.   Allergic/Immunologic: Negative for immunocompromised state.   Neurological: Negative for dizziness, speech difficulty, weakness and headaches.   Hematological: Negative for adenopathy. Does not bruise/bleed " easily.   Psychiatric/Behavioral: Negative.        History reviewed. No pertinent past medical history.    Allergies   Allergen Reactions   • Sulfa Antibiotics Itching       Past Surgical History:   Procedure Laterality Date   • TUBAL ABDOMINAL LIGATION         History reviewed. No pertinent family history.    Social History     Social History   • Marital status: Single     Spouse name: N/A   • Number of children: N/A   • Years of education: N/A     Social History Main Topics   • Smoking status: Current Every Day Smoker     Packs/day: 1.00     Types: Cigarettes   • Smokeless tobacco: None   • Alcohol use Yes   • Drug use: Yes   • Sexual activity: Defer     Other Topics Concern   • None     Social History Narrative   • None           Objective   Physical Exam   Constitutional: She is oriented to person, place, and time. She appears well-developed and well-nourished. She appears distressed (appears to feel bad).   HENT:   Head: Normocephalic and atraumatic.   Nose: Nose normal.   Mouth/Throat: Oropharynx is clear and moist.   Eyes: Conjunctivae and EOM are normal. Pupils are equal, round, and reactive to light. No scleral icterus.   Neck: Normal range of motion. Neck supple.   Cardiovascular: Regular rhythm and normal heart sounds.    No murmur heard.  Mildly tachycardic   Pulmonary/Chest: She is in respiratory distress. She has wheezes. She has no rales. She exhibits no tenderness.   Abdominal: Soft. Bowel sounds are normal. There is no tenderness.   Musculoskeletal: Normal range of motion. She exhibits no edema or tenderness.   Neurological: She is alert and oriented to person, place, and time.   Skin: Skin is warm and dry. No rash noted. She is not diaphoretic.   Psychiatric: She has a normal mood and affect.   Nursing note and vitals reviewed.      Procedures         ED Course  ED Course    12:21 PM  The patient has had little to no relief of her shortness of breath and wheezing after 2 neb treatments and a dose  [facility].  Chest x-ray shows some inflammatory changes but no consolidation is seen by the radiologist.  Influenza screen was negative.  A PCR flu test has been collected as well and is pending.  A blood gas collected after her second neb treatment shows her to be hypoxic with a PO2 of 53, PCO2 of 31 and a pH of 7.46.  White count is normal at 8.9 normal H&H.  Normal chemistries.  On last recheck, the patient is still wheezing quite a bit.  Her O2 sats with 2 L is 92%.  I have paged the hospitalist for admission.  Recent Results (from the past 24 hour(s))   Influenza Antigen, Rapid - Swab, Nasopharynx    Collection Time: 02/14/18  9:12 AM   Result Value Ref Range    Influenza A Ag, EIA Negative Negative    Influenza B Ag, EIA Negative Negative   Comprehensive Metabolic Panel    Collection Time: 02/14/18  9:20 AM   Result Value Ref Range    Glucose 121 (H) 70 - 100 mg/dL    BUN 11 9 - 23 mg/dL    Creatinine 0.80 0.60 - 1.30 mg/dL    Sodium 133 132 - 146 mmol/L    Potassium 4.3 3.5 - 5.5 mmol/L    Chloride 102 99 - 109 mmol/L    CO2 24.0 20.0 - 31.0 mmol/L    Calcium 9.8 8.7 - 10.4 mg/dL    Total Protein 8.0 5.7 - 8.2 g/dL    Albumin 4.60 3.20 - 4.80 g/dL    ALT (SGPT) 33 7 - 40 U/L    AST (SGOT) 27 0 - 33 U/L    Alkaline Phosphatase 80 25 - 100 U/L    Total Bilirubin 0.3 0.3 - 1.2 mg/dL    eGFR Non African Amer 74 >60 mL/min/1.73    Globulin 3.4 gm/dL    A/G Ratio 1.4 (L) 1.5 - 2.5 g/dL    BUN/Creatinine Ratio 13.8 7.0 - 25.0    Anion Gap 7.0 3.0 - 11.0 mmol/L   CBC Auto Differential    Collection Time: 02/14/18  9:20 AM   Result Value Ref Range    WBC 8.92 3.50 - 10.80 10*3/mm3    RBC 4.56 3.89 - 5.14 10*6/mm3    Hemoglobin 14.4 11.5 - 15.5 g/dL    Hematocrit 42.8 34.5 - 44.0 %    MCV 93.9 80.0 - 99.0 fL    MCH 31.6 (H) 27.0 - 31.0 pg    MCHC 33.6 32.0 - 36.0 g/dL    RDW 15.5 (H) 11.3 - 14.5 %    RDW-SD 53.2 37.0 - 54.0 fl    MPV 10.7 6.0 - 12.0 fL    Platelets 163 150 - 450 10*3/mm3    Neutrophil % 83.0 (H)  41.0 - 71.0 %    Lymphocyte % 7.5 (L) 24.0 - 44.0 %    Monocyte % 8.3 0.0 - 12.0 %    Eosinophil % 0.4 0.0 - 3.0 %    Basophil % 0.2 0.0 - 1.0 %    Immature Grans % 0.6 0.0 - 0.6 %    Neutrophils, Absolute 7.40 1.50 - 8.30 10*3/mm3    Lymphocytes, Absolute 0.67 0.60 - 4.80 10*3/mm3    Monocytes, Absolute 0.74 0.00 - 1.00 10*3/mm3    Eosinophils, Absolute 0.04 0.00 - 0.30 10*3/mm3    Basophils, Absolute 0.02 0.00 - 0.20 10*3/mm3    Immature Grans, Absolute 0.05 (H) 0.00 - 0.03 10*3/mm3   Light Blue Top    Collection Time: 02/14/18  9:20 AM   Result Value Ref Range    Extra Tube hold for add-on    Green Top (Gel)    Collection Time: 02/14/18  9:20 AM   Result Value Ref Range    Extra Tube Hold for add-ons.    Lavender Top    Collection Time: 02/14/18  9:20 AM   Result Value Ref Range    Extra Tube hold for add-on    Gold Top - SST    Collection Time: 02/14/18  9:20 AM   Result Value Ref Range    Extra Tube Hold for add-ons.    Blood Gas, Arterial    Collection Time: 02/14/18 11:11 AM   Result Value Ref Range    Site Arterial: right radial     Erwin's Test N/A     pH, Arterial 7.469 (H) 7.350 - 7.450 pH units    pCO2, Arterial 31.9 (L) 35.0 - 45.0 mm Hg    pO2, Arterial 53.1 (L) 83.0 - 108.0 mm Hg    HCO3, Arterial 23.2 20.0 - 26.0 mmol/L    Base Excess, Arterial 0.2 0.0 - 2.0 mmol/L    O2 Saturation, Arterial 87.0 %    Hemoglobin, Blood Gas 13.8 (L) 14 - 18 g/dL    Hematocrit, Blood Gas 42.3 %    Oxyhemoglobin 87.4 (L) 94 - 99 %    Methemoglobin 0.70 0.00 - 1.50 %    Carboxyhemoglobin 1.6 0 - 2 %    CO2 Content 24.1 22 - 33    Barometric Pressure for Blood Gas  mmHg    Modality Room Air     FIO2 21 %     Note: In addition to lab results from this visit, the labs listed above may include labs taken at another facility or during a different encounter within the last 24 hours. Please correlate lab times with ED admission and discharge times for further clarification of the services performed during this visit.    XR  Chest 1 View   Final Result   Airspace opacity right hilum perihilar area and right lung   base, likely acute inflammatory process. Otherwise, the left lung is   clear and there is no free fluid.       D:  02/14/2018   E:  02/14/2018       This report was finalized on 2/14/2018 11:53 AM by Dr. Raji Chamberlain MD.            Vitals:    02/14/18 1030 02/14/18 1100 02/14/18 1130 02/14/18 1200   BP: 121/61 141/68  110/57   BP Location:       Patient Position:       Pulse: 105 111 107 105   Resp:       Temp:       TempSrc:       SpO2: 93% 90% 93% 93%   Weight:       Height:         Medications   sodium chloride 0.9 % flush 10 mL (not administered)   ipratropium-albuterol (DUO-NEB) nebulizer solution 3 mL (3 mL Nebulization Given 2/14/18 0934)   albuterol (PROVENTIL) nebulizer solution 0.083% 2.5 mg/3mL (2.5 mg Nebulization Given 2/14/18 1013)   methylPREDNISolone sodium succinate (SOLU-Medrol) injection 125 mg (125 mg Intravenous Given 2/14/18 1025)   ketorolac (TORADOL) injection 15 mg (15 mg Intravenous Given 2/14/18 1126)     ECG/EMG Results (last 24 hours)     ** No results found for the last 24 hours. **                      [facility]    Final diagnoses:   Shortness of breath   Hypoxemia   Wheezes   Bronchitis            MARC Ortiz  02/14/18 1224

## 2018-02-15 PROBLEM — R73.9 HYPERGLYCEMIA: Status: ACTIVE | Noted: 2018-02-15

## 2018-02-15 PROBLEM — N39.0 UTI (URINARY TRACT INFECTION): Status: ACTIVE | Noted: 2018-02-15

## 2018-02-15 PROBLEM — J18.9 COMMUNITY ACQUIRED PNEUMONIA OF RIGHT LOWER LOBE OF LUNG: Status: ACTIVE | Noted: 2018-02-15

## 2018-02-15 LAB
BACTERIA UR QL AUTO: ABNORMAL /HPF
BILIRUB UR QL STRIP: NEGATIVE
CLARITY UR: CLEAR
COLOR UR: YELLOW
GLUCOSE BLDC GLUCOMTR-MCNC: 145 MG/DL (ref 70–130)
GLUCOSE BLDC GLUCOMTR-MCNC: 262 MG/DL (ref 70–130)
GLUCOSE UR STRIP-MCNC: ABNORMAL MG/DL
HGB UR QL STRIP.AUTO: ABNORMAL
HYALINE CASTS UR QL AUTO: ABNORMAL /LPF
KETONES UR QL STRIP: NEGATIVE
LEUKOCYTE ESTERASE UR QL STRIP.AUTO: NEGATIVE
NITRITE UR QL STRIP: POSITIVE
PH UR STRIP.AUTO: 5.5 [PH] (ref 5–8)
PROT UR QL STRIP: ABNORMAL
RBC # UR: ABNORMAL /HPF
REF LAB TEST METHOD: ABNORMAL
SP GR UR STRIP: 1.01 (ref 1–1.03)
SQUAMOUS #/AREA URNS HPF: ABNORMAL /HPF
UROBILINOGEN UR QL STRIP: ABNORMAL
WBC UR QL AUTO: ABNORMAL /HPF

## 2018-02-15 PROCEDURE — 25010000002 CEFTRIAXONE PER 250 MG: Performed by: INTERNAL MEDICINE

## 2018-02-15 PROCEDURE — 82962 GLUCOSE BLOOD TEST: CPT

## 2018-02-15 PROCEDURE — 94799 UNLISTED PULMONARY SVC/PX: CPT

## 2018-02-15 PROCEDURE — 25010000002 ENOXAPARIN PER 10 MG: Performed by: INTERNAL MEDICINE

## 2018-02-15 PROCEDURE — 94760 N-INVAS EAR/PLS OXIMETRY 1: CPT

## 2018-02-15 PROCEDURE — 87086 URINE CULTURE/COLONY COUNT: CPT | Performed by: INTERNAL MEDICINE

## 2018-02-15 PROCEDURE — 81001 URINALYSIS AUTO W/SCOPE: CPT | Performed by: INTERNAL MEDICINE

## 2018-02-15 PROCEDURE — 63710000001 INSULIN LISPRO (HUMAN) PER 5 UNITS: Performed by: INTERNAL MEDICINE

## 2018-02-15 PROCEDURE — 94640 AIRWAY INHALATION TREATMENT: CPT

## 2018-02-15 PROCEDURE — 99233 SBSQ HOSP IP/OBS HIGH 50: CPT | Performed by: INTERNAL MEDICINE

## 2018-02-15 PROCEDURE — 25010000002 METHYLPREDNISOLONE PER 40 MG: Performed by: INTERNAL MEDICINE

## 2018-02-15 RX ORDER — CEFTRIAXONE SODIUM 1 G/50ML
1 INJECTION, SOLUTION INTRAVENOUS EVERY 24 HOURS
Status: DISCONTINUED | OUTPATIENT
Start: 2018-02-15 | End: 2018-02-15

## 2018-02-15 RX ORDER — CEFTRIAXONE SODIUM 1 G/50ML
1 INJECTION, SOLUTION INTRAVENOUS EVERY 24 HOURS
Status: DISCONTINUED | OUTPATIENT
Start: 2018-02-15 | End: 2018-02-17 | Stop reason: HOSPADM

## 2018-02-15 RX ORDER — NICOTINE POLACRILEX 4 MG
15 LOZENGE BUCCAL
Status: DISCONTINUED | OUTPATIENT
Start: 2018-02-15 | End: 2018-02-17 | Stop reason: HOSPADM

## 2018-02-15 RX ORDER — DOXYCYCLINE HYCLATE 100 MG/1
100 CAPSULE ORAL EVERY 12 HOURS SCHEDULED
Status: DISCONTINUED | OUTPATIENT
Start: 2018-02-15 | End: 2018-02-17 | Stop reason: HOSPADM

## 2018-02-15 RX ORDER — DEXTROSE MONOHYDRATE 25 G/50ML
25 INJECTION, SOLUTION INTRAVENOUS
Status: DISCONTINUED | OUTPATIENT
Start: 2018-02-15 | End: 2018-02-17 | Stop reason: HOSPADM

## 2018-02-15 RX ADMIN — IPRATROPIUM BROMIDE AND ALBUTEROL SULFATE 3 ML: .5; 3 SOLUTION RESPIRATORY (INHALATION) at 11:47

## 2018-02-15 RX ADMIN — CEFTRIAXONE SODIUM 1 G: 1 INJECTION, SOLUTION INTRAVENOUS at 13:59

## 2018-02-15 RX ADMIN — IPRATROPIUM BROMIDE AND ALBUTEROL SULFATE 3 ML: .5; 3 SOLUTION RESPIRATORY (INHALATION) at 06:29

## 2018-02-15 RX ADMIN — IPRATROPIUM BROMIDE AND ALBUTEROL SULFATE 3 ML: .5; 3 SOLUTION RESPIRATORY (INHALATION) at 03:32

## 2018-02-15 RX ADMIN — DOXYCYCLINE HYCLATE 100 MG: 100 CAPSULE ORAL at 20:44

## 2018-02-15 RX ADMIN — ENOXAPARIN SODIUM 40 MG: 40 INJECTION SUBCUTANEOUS at 14:12

## 2018-02-15 RX ADMIN — BENZONATATE 200 MG: 100 CAPSULE, LIQUID FILLED ORAL at 17:06

## 2018-02-15 RX ADMIN — DOXYCYCLINE HYCLATE 100 MG: 100 CAPSULE ORAL at 12:44

## 2018-02-15 RX ADMIN — METHYLPREDNISOLONE SODIUM SUCCINATE 60 MG: 40 INJECTION, POWDER, FOR SOLUTION INTRAMUSCULAR; INTRAVENOUS at 11:40

## 2018-02-15 RX ADMIN — IPRATROPIUM BROMIDE AND ALBUTEROL SULFATE 3 ML: .5; 3 SOLUTION RESPIRATORY (INHALATION) at 00:18

## 2018-02-15 RX ADMIN — BENZONATATE 200 MG: 100 CAPSULE, LIQUID FILLED ORAL at 08:05

## 2018-02-15 RX ADMIN — IPRATROPIUM BROMIDE AND ALBUTEROL SULFATE 3 ML: .5; 3 SOLUTION RESPIRATORY (INHALATION) at 20:13

## 2018-02-15 RX ADMIN — ACETAMINOPHEN 650 MG: 325 TABLET, FILM COATED ORAL at 08:05

## 2018-02-15 RX ADMIN — ACETAMINOPHEN 650 MG: 325 TABLET, FILM COATED ORAL at 17:06

## 2018-02-15 RX ADMIN — IPRATROPIUM BROMIDE AND ALBUTEROL SULFATE 3 ML: .5; 3 SOLUTION RESPIRATORY (INHALATION) at 23:26

## 2018-02-15 RX ADMIN — IPRATROPIUM BROMIDE AND ALBUTEROL SULFATE 3 ML: .5; 3 SOLUTION RESPIRATORY (INHALATION) at 15:59

## 2018-02-15 RX ADMIN — INSULIN LISPRO 4 UNITS: 100 INJECTION, SOLUTION INTRAVENOUS; SUBCUTANEOUS at 20:44

## 2018-02-15 NOTE — PLAN OF CARE
Problem: Respiratory Insufficiency (Adult)  Goal: Acid/Base Balance  Outcome: Ongoing (interventions implemented as appropriate)    Goal: Effective Ventilation  Outcome: Ongoing (interventions implemented as appropriate)      Problem: Patient Care Overview (Adult)  Goal: Plan of Care Review  Outcome: Ongoing (interventions implemented as appropriate)      Problem: Hyperglycemia, Persistent (Adult)  Goal: Signs and Symptoms of Listed Potential Problems Will be Absent or Manageable (Hyperglycemia, Persistent)  Outcome: Ongoing (interventions implemented as appropriate)      Problem: Pneumonia (Adult)  Goal: Signs and Symptoms of Listed Potential Problems Will be Absent or Manageable (Pneumonia)  Outcome: Ongoing (interventions implemented as appropriate)

## 2018-02-15 NOTE — PLAN OF CARE
Problem: Respiratory Insufficiency (Adult)  Goal: Identify Related Risk Factors and Signs and Symptoms  Outcome: Outcome(s) achieved Date Met: 02/15/18   02/15/18 0457   Respiratory Insufficiency   Related Risk Factors (Respiratory Insufficiency) activity intolerance;smoking   Signs and Symptoms (Respiratory Insufficiency) abnormal ABGs;abnormal breath sounds;breathing pattern changes;decreased oxygen saturation;dyspnea;shortness of breath     Goal: Acid/Base Balance  Outcome: Ongoing (interventions implemented as appropriate)    Goal: Effective Ventilation  Outcome: Ongoing (interventions implemented as appropriate)

## 2018-02-15 NOTE — PROGRESS NOTES
Discharge Planning Assessment  [facility]     Patient Name: Micaela Reyes  MRN: [id number]  Today's Date: 2/15/2018    Admit Date: 2/14/2018          Discharge Needs Assessment       02/15/18 1030    Living Environment    Lives With alone    Living Arrangements house    Home Accessibility no concerns    Type of Financial/Environmental Concern none    Transportation Available car;family or friend will provide    Living Environment    Provides Primary Care For no one    Quality Of Family Relationships supportive;helpful;involved    Able to Return to Prior Living Arrangements yes    Discharge Needs Assessment    Concerns To Be Addressed discharge planning concerns    Readmission Within The Last 30 Days no previous admission in last 30 days    Equipment Currently Used at Home none    Equipment Needed After Discharge --   TBD    Discharge Disposition still a patient    Discharge Contact Information if Applicable Valerie Reyes, daughter, [phone]            Discharge Plan       02/15/18 1031    Case Management/Social Work Plan    Plan Home    Patient/Family In Agreement With Plan yes    Additional Comments Met with patient and her daughter in the room to initiate discharge planning. Patient lives alone in a home in [place] [facility]. She is independent with ADLs and mobility and does not use any DME. Her goal is home at discharge. Patient does not have a PCP and would like to establish care with a [facility] provider, preferably at [facility]. Called Gabriella BARBOUR, ext. [phone], and she will try to schedule a new-patient appointment with a provider at [facility] next week when they begin taking new patients again. Gabriella will f/u with the patient at home, and  has also provided Gabriella's phone number. Patient may require home oxgygen temporarily. She is concerned about needing oxygen because she states she has to go back to work. Family will provide her ride. CM will continue to follow.          Discharge  Placement     No information found                Demographic Summary       02/15/18 1028    Referral Information    Admission Type inpatient    Referral Source admission list    Reason For Consult discharge planning    Record Reviewed history and physical    Contact Information    Permission Granted to Share Information With ;family/designee   daughter, Valerie Reyes    Primary Care Physician Information    Name Needs PCP            Functional Status       02/15/18 1029    Functional Status Prior    Ambulation 0-->independent    Transferring 0-->independent    Toileting 0-->independent    Bathing 0-->independent    Dressing 0-->independent    Eating 0-->independent    Communication 0-->understands/communicates without difficulty    Swallowing 0-->swallows foods/liquids without difficulty    Employment/Financial    Employment/Finance Comments Confirmed with patient that she has medical and rx coverage through [employer] [facility]; no issues affording meds            Psychosocial     None            Abuse/Neglect     None            Legal     None            Substance Abuse     None            Patient Forms     None          Linda Mckeon

## 2018-02-15 NOTE — PROGRESS NOTES
[facility] Medicine Services  PROGRESS NOTE    Patient Name: Micaela Reyes  : 1960  MRN: [id number]    Date of Admission: 2018  Length of Stay: 1  Primary Care Physician: No Known Provider    Subjective   Subjective     CC: soa    HPI: Sitting up in bed. Still SOA and with cough, but better than last night. Also complaining of urinary retention and burning.    Review of Systems   Gen- No fevers, chills  CV- No chest pain, palpitations  GI- No N/V/D, abd pain    Otherwise ROS is negative except as mentioned in the HPI.    Objective   Objective     Vital Signs:   Temp:  [97.5 °F (36.4 °C)-100.1 °F (37.8 °C)] 98.5 °F (36.9 °C)  Heart Rate:  [] 90  Resp:  [18-24] 20  BP: (121-144)/(51-72) 130/70        Physical Exam:  Constitutional: No acute distress, awake, alert,   HENT: NCAT, mucous membranes moist  Respiratory: Normal WOB, bilateral wheezing throughout  Cardiovascular: RRR, no murmurs, rubs, or gallops, palpable pedal pulses bilaterally  Gastrointestinal: Positive bowel sounds, soft, nontender, nondistended  Musculoskeletal: No bilateral ankle edema  Psychiatric: Appropriate affect, cooperative  Neurologic: Oriented x 3, strength symmetric in all extremities, Cranial Nerves grossly intact to confrontation, speech clear  Skin: No rashes    Results Reviewed:  I have personally reviewed current lab, radiology, and data and agree.      Results from last 7 days  Lab Units 18  0920   WBC 10*3/mm3 8.92   HEMOGLOBIN g/dL 14.4   HEMATOCRIT % 42.8   PLATELETS 10*3/mm3 163       Results from last 7 days  Lab Units 18  0920   SODIUM mmol/L 133   POTASSIUM mmol/L 4.3   CHLORIDE mmol/L 102   CO2 mmol/L 24.0   BUN mg/dL 11   CREATININE mg/dL 0.80   GLUCOSE mg/dL 121*   CALCIUM mg/dL 9.8   ALT (SGPT) U/L 33   AST (SGOT) U/L 27     Estimated Creatinine Clearance: 75.6 mL/min (by C-G formula based on Cr of 0.8).  No results found for: BNP  pH, Arterial   Date Value Ref Range  Status   02/14/2018 7.469 (H) 7.350 - 7.450 pH units Final       Microbiology Results Abnormal     Procedure Component Value - Date/Time    Influenza A & B, RT PCR - Swab, Nasopharynx [[id number]]  (Normal) Collected:  02/14/18 1128    Lab Status:  Final result Specimen:  Swab from Nasopharynx Updated:  02/14/18 1320     Influenza A PCR Not Detected     Influenza B PCR Not Detected    Influenza Antigen, Rapid - Swab, Nasopharynx [[id number]]  (Normal) Collected:  02/14/18 0912    Lab Status:  Final result Specimen:  Swab from Nasopharynx Updated:  02/14/18 0933     Influenza A Ag, EIA Negative     Influenza B Ag, EIA Negative        CXR personally reviewed with right lung opacification. Agree with interpretation.       I have reviewed the medications.    Assessment/Plan   Assessment / Plan     Hospital Problem List     * (Principal)Acute respiratory failure with hypoxia    Chronic obstructive pulmonary disease with acute exacerbation    UTI (urinary tract infection)    Community acquired pneumonia of right lower lobe of lung             Brief Hospital Course to date:  Micaela Reyes is a 57 y.o. female admitted from home with SOA with RLL CAP and COPD exacerbation. Has also been found to have UTI.    Assessment & Plan:  --IV rocephin and doxy for presumed CAP. Continue IV steroids and nebs.  --Rocephin as above for UTI.  --SSI for hyperglycemia likely induced by steroids.    *Doesn't have formal diagnosis of COPD though almost certainly has underlying COPD given long smoking hx. Needs outpatient PFTs in 4-6 weeks once recovered from illness.    DVT Prophylaxis:  Lovenox    CODE STATUS: Full Code    Disposition: I expect the patient to be discharged home in 1-2 days.      Electronically signed by Sumi Parker II, DO, 02/15/18, 12:09 PM.

## 2018-02-16 PROBLEM — F41.1 GENERALIZED ANXIETY DISORDER: Status: ACTIVE | Noted: 2018-02-16

## 2018-02-16 LAB
GLUCOSE BLDC GLUCOMTR-MCNC: 147 MG/DL (ref 70–130)
GLUCOSE BLDC GLUCOMTR-MCNC: 163 MG/DL (ref 70–130)
GLUCOSE BLDC GLUCOMTR-MCNC: 167 MG/DL (ref 70–130)
GLUCOSE BLDC GLUCOMTR-MCNC: 182 MG/DL (ref 70–130)
HBA1C MFR BLD: 5.8 % (ref 4.8–5.6)

## 2018-02-16 PROCEDURE — 94799 UNLISTED PULMONARY SVC/PX: CPT

## 2018-02-16 PROCEDURE — 63710000001 PREDNISONE PER 1 MG: Performed by: PHYSICIAN ASSISTANT

## 2018-02-16 PROCEDURE — 25010000002 ENOXAPARIN PER 10 MG: Performed by: INTERNAL MEDICINE

## 2018-02-16 PROCEDURE — 83036 HEMOGLOBIN GLYCOSYLATED A1C: CPT | Performed by: PHYSICIAN ASSISTANT

## 2018-02-16 PROCEDURE — 25010000002 CEFTRIAXONE PER 250 MG: Performed by: INTERNAL MEDICINE

## 2018-02-16 PROCEDURE — 99232 SBSQ HOSP IP/OBS MODERATE 35: CPT | Performed by: PHYSICIAN ASSISTANT

## 2018-02-16 PROCEDURE — 82962 GLUCOSE BLOOD TEST: CPT

## 2018-02-16 PROCEDURE — 25010000002 METHYLPREDNISOLONE PER 40 MG: Performed by: INTERNAL MEDICINE

## 2018-02-16 RX ORDER — BUDESONIDE 0.5 MG/2ML
0.5 INHALANT ORAL
Status: DISCONTINUED | OUTPATIENT
Start: 2018-02-16 | End: 2018-02-17 | Stop reason: HOSPADM

## 2018-02-16 RX ORDER — NICOTINE 21 MG/24HR
1 PATCH, TRANSDERMAL 24 HOURS TRANSDERMAL EVERY 24 HOURS
Status: DISCONTINUED | OUTPATIENT
Start: 2018-02-16 | End: 2018-02-17 | Stop reason: HOSPADM

## 2018-02-16 RX ORDER — ESCITALOPRAM OXALATE 20 MG/1
10 TABLET ORAL DAILY
Status: DISCONTINUED | OUTPATIENT
Start: 2018-02-16 | End: 2018-02-17 | Stop reason: HOSPADM

## 2018-02-16 RX ORDER — PREDNISONE 20 MG/1
40 TABLET ORAL
Status: DISCONTINUED | OUTPATIENT
Start: 2018-02-16 | End: 2018-02-17 | Stop reason: HOSPADM

## 2018-02-16 RX ADMIN — BENZONATATE 200 MG: 100 CAPSULE, LIQUID FILLED ORAL at 16:39

## 2018-02-16 RX ADMIN — METHYLPREDNISOLONE SODIUM SUCCINATE 60 MG: 40 INJECTION, POWDER, FOR SOLUTION INTRAMUSCULAR; INTRAVENOUS at 00:41

## 2018-02-16 RX ADMIN — BENZONATATE 200 MG: 100 CAPSULE, LIQUID FILLED ORAL at 21:14

## 2018-02-16 RX ADMIN — INSULIN LISPRO 2 UNITS: 100 INJECTION, SOLUTION INTRAVENOUS; SUBCUTANEOUS at 08:55

## 2018-02-16 RX ADMIN — IPRATROPIUM BROMIDE AND ALBUTEROL SULFATE 3 ML: .5; 3 SOLUTION RESPIRATORY (INHALATION) at 10:53

## 2018-02-16 RX ADMIN — CEFTRIAXONE SODIUM 1 G: 1 INJECTION, SOLUTION INTRAVENOUS at 14:03

## 2018-02-16 RX ADMIN — ACETAMINOPHEN 650 MG: 325 TABLET, FILM COATED ORAL at 23:05

## 2018-02-16 RX ADMIN — BUDESONIDE 0.5 MG: 0.5 INHALANT RESPIRATORY (INHALATION) at 19:04

## 2018-02-16 RX ADMIN — ESCITALOPRAM OXALATE 10 MG: 20 TABLET ORAL at 10:44

## 2018-02-16 RX ADMIN — BENZONATATE 200 MG: 100 CAPSULE, LIQUID FILLED ORAL at 08:53

## 2018-02-16 RX ADMIN — NICOTINE 1 PATCH: 14 PATCH TRANSDERMAL at 10:45

## 2018-02-16 RX ADMIN — DOXYCYCLINE HYCLATE 100 MG: 100 CAPSULE ORAL at 21:07

## 2018-02-16 RX ADMIN — ENOXAPARIN SODIUM 40 MG: 40 INJECTION SUBCUTANEOUS at 14:03

## 2018-02-16 RX ADMIN — DOXYCYCLINE HYCLATE 100 MG: 100 CAPSULE ORAL at 08:53

## 2018-02-16 RX ADMIN — ACETAMINOPHEN 650 MG: 325 TABLET, FILM COATED ORAL at 08:53

## 2018-02-16 RX ADMIN — IPRATROPIUM BROMIDE AND ALBUTEROL SULFATE 3 ML: .5; 3 SOLUTION RESPIRATORY (INHALATION) at 19:04

## 2018-02-16 RX ADMIN — ACETAMINOPHEN 650 MG: 325 TABLET, FILM COATED ORAL at 16:39

## 2018-02-16 RX ADMIN — BUDESONIDE 0.5 MG: 0.5 INHALANT RESPIRATORY (INHALATION) at 10:53

## 2018-02-16 RX ADMIN — INSULIN LISPRO 2 UNITS: 100 INJECTION, SOLUTION INTRAVENOUS; SUBCUTANEOUS at 21:07

## 2018-02-16 RX ADMIN — PREDNISONE 40 MG: 20 TABLET ORAL at 10:44

## 2018-02-16 RX ADMIN — IPRATROPIUM BROMIDE AND ALBUTEROL SULFATE 3 ML: .5; 3 SOLUTION RESPIRATORY (INHALATION) at 23:45

## 2018-02-16 RX ADMIN — INSULIN LISPRO 2 UNITS: 100 INJECTION, SOLUTION INTRAVENOUS; SUBCUTANEOUS at 17:14

## 2018-02-16 RX ADMIN — IPRATROPIUM BROMIDE AND ALBUTEROL SULFATE 3 ML: .5; 3 SOLUTION RESPIRATORY (INHALATION) at 06:51

## 2018-02-16 RX ADMIN — IPRATROPIUM BROMIDE AND ALBUTEROL SULFATE 3 ML: .5; 3 SOLUTION RESPIRATORY (INHALATION) at 15:58

## 2018-02-16 NOTE — PROGRESS NOTES
[facility] Medicine Services  PROGRESS NOTE    Patient Name: Micaela Reyes  : 1960  MRN: [id number]    Date of Admission: 2018  Length of Stay: 2  Primary Care Physician: No Known Provider    Subjective     CC: f/u PNA/COPD exacerbation    HPI: Making slow improvement. Weaned to 1L O2 today. Still coughing.   She reports persistent anxiety symptoms.   Urinary symptoms improving.     Review of Systems   Gen- No fevers, chills  CV- No chest pain, palpitations  GI- No N/V/D, abd pain    Otherwise ROS is negative except as mentioned in the HPI.    Objective     Vital Signs:   Temp:  [97.3 °F (36.3 °C)-98.6 °F (37 °C)] 97.3 °F (36.3 °C)  Heart Rate:  [] 86  Resp:  [16-18] 16  BP: (124-157)/(61-81) 137/70        Physical Exam:  Constitutional: Awake, alert and conversant. Becomes dyspneic with conversation easily.   HENT: NCAT, mucous membranes moist  Respiratory: Normal WOB, bilateral wheezing throughout  Cardiovascular: RRR, no murmurs, rubs, or gallops, palpable pedal pulses bilaterally  Gastrointestinal: Positive bowel sounds, soft, nontender, nondistended  Musculoskeletal: No bilateral ankle edema  Psychiatric: Appropriate affect, cooperative  Neurologic: Oriented x 3, strength symmetric in all extremities, Cranial Nerves grossly intact to confrontation, speech clear  Skin: No rashes    Results Reviewed:  I have personally reviewed current lab, radiology, and data and agree.      Results from last 7 days  Lab Units 18  0920   WBC 10*3/mm3 8.92   HEMOGLOBIN g/dL 14.4   HEMATOCRIT % 42.8   PLATELETS 10*3/mm3 163       Results from last 7 days  Lab Units 18  0920   SODIUM mmol/L 133   POTASSIUM mmol/L 4.3   CHLORIDE mmol/L 102   CO2 mmol/L 24.0   BUN mg/dL 11   CREATININE mg/dL 0.80   GLUCOSE mg/dL 121*   CALCIUM mg/dL 9.8   ALT (SGPT) U/L 33   AST (SGOT) U/L 27     Estimated Creatinine Clearance: 75.6 mL/min (by C-G formula based on Cr of 0.8).     pH, Arterial    Date Value Ref Range Status   02/14/2018 7.469 (H) 7.350 - 7.450 pH units Final     Microbiology Results Abnormal     Procedure Component Value - Date/Time    Urine Culture - Urine, Urine, Clean Catch [[id number]]  (Normal) Collected:  02/15/18 1015    Lab Status:  Preliminary result Specimen:  Urine from Urine, Clean Catch Updated:  02/16/18 0842     Urine Culture Culture in progress    Influenza A & B, RT PCR - Swab, Nasopharynx [[id number]]  (Normal) Collected:  02/14/18 1128    Lab Status:  Final result Specimen:  Swab from Nasopharynx Updated:  02/14/18 1320     Influenza A PCR Not Detected     Influenza B PCR Not Detected    Influenza Antigen, Rapid - Swab, Nasopharynx [[id number]]  (Normal) Collected:  02/14/18 0912    Lab Status:  Final result Specimen:  Swab from Nasopharynx Updated:  02/14/18 0933     Influenza A Ag, EIA Negative     Influenza B Ag, EIA Negative        CXR personally reviewed with right lung opacification. Agree with interpretation.    I have reviewed the medications.    Assessment / Plan     Hospital Problem List     * (Principal)Acute respiratory failure with hypoxia    Chronic obstructive pulmonary disease with acute exacerbation    UTI (urinary tract infection)    Community acquired pneumonia of right lower lobe of lung    Hyperglycemia    Generalized anxiety disorder         Brief Hospital Course to date:  Micaela Reyes is a 57 y.o. female admitted from home with SOA with RLL CAP and COPD exacerbation. Has also been found to have UTI.    Assessment & Plan:  - Continue IV Rocephin and Doxycycline for presumed CAP  - O2 PRN, wean as able. Down to 1L today. May need O2 at discharge. CM aware.   - Transition IV steroids to PO today  - Duonebs, add Pulmicort neb  - Rocephin as above for UTI. Follow culture  - SSI for steroid-induced hyperglycemia. Hgb A1c 5.8%   - Start Lexapro for generalized anxiety  - Nicotine patch - patient is interested in cessation and would like patches at  discharge    *Doesn't have formal diagnosis of COPD though almost certainly has underlying COPD given long smoking hx. Needs outpatient PFTs in 4-6 weeks once recovered from illness.   - Outpatient referral to pulmonology placed    DVT Prophylaxis:  Lovenox  CODE STATUS: Full Code    Disposition: I expect the patient to be discharged home in 1-2 days       Electronically signed by Mariel Rainey PA-C, 02/16/18, 12:36 PM.

## 2018-02-16 NOTE — PLAN OF CARE
Problem: Patient Care Overview (Adult)  Goal: Plan of Care Review  Outcome: Ongoing (interventions implemented as appropriate)      Problem: Hyperglycemia, Persistent (Adult)  Goal: Signs and Symptoms of Listed Potential Problems Will be Absent or Manageable (Hyperglycemia, Persistent)  Outcome: Ongoing (interventions implemented as appropriate)      Problem: Pneumonia (Adult)  Goal: Signs and Symptoms of Listed Potential Problems Will be Absent or Manageable (Pneumonia)  Outcome: Ongoing (interventions implemented as appropriate)

## 2018-02-16 NOTE — PLAN OF CARE
Problem: Patient Care Overview (Adult)  Goal: Plan of Care Review  Outcome: Ongoing (interventions implemented as appropriate)   02/16/18 0453   Coping/Psychosocial Response Interventions   Plan Of Care Reviewed With patient   Patient Care Overview   Progress improving   Outcome Evaluation   Outcome Summary/Follow up Plan PT's lungs are starting to sound better and she is less SOA.        Problem: Hyperglycemia, Persistent (Adult)  Goal: Signs and Symptoms of Listed Potential Problems Will be Absent or Manageable (Hyperglycemia, Persistent)  Outcome: Ongoing (interventions implemented as appropriate)      Problem: Pneumonia (Adult)  Goal: Signs and Symptoms of Listed Potential Problems Will be Absent or Manageable (Pneumonia)  Outcome: Ongoing (interventions implemented as appropriate)

## 2018-02-16 NOTE — PROGRESS NOTES
Continued Stay Note   Kira     Patient Name: Micaela Reyes  MRN: [id number]  Today's Date: 2/16/2018    Admit Date: 2/14/2018          Discharge Plan       02/16/18 1558    Case Management/Social Work Plan    Plan Home    Patient/Family In Agreement With Plan yes    Additional Comments Met with patient in the room to update the discharge plan. Patient's O2 sat is 96% on room air, so she will not qualify for home oxygen. Gabriella with [facility] has scheduled a new-patient appointment with JULIANNE Christopher, at [facility] for Thursday, 3/1/18, at 9:45 a.m. All information has been entered in patient's AVS. Patient denies any other discharge needs. CM will continue to follow.               Discharge Codes     None            Linda Mckeon

## 2018-02-17 VITALS
BODY MASS INDEX: 28.17 KG/M2 | TEMPERATURE: 97.5 F | DIASTOLIC BLOOD PRESSURE: 89 MMHG | RESPIRATION RATE: 20 BRPM | HEIGHT: 63 IN | OXYGEN SATURATION: 96 % | WEIGHT: 159 LBS | HEART RATE: 81 BPM | SYSTOLIC BLOOD PRESSURE: 161 MMHG

## 2018-02-17 PROBLEM — I10 ESSENTIAL HYPERTENSION: Status: ACTIVE | Noted: 2018-02-17

## 2018-02-17 PROBLEM — T38.0X5A STEROID-INDUCED HYPERGLYCEMIA: Status: ACTIVE | Noted: 2018-02-15

## 2018-02-17 PROBLEM — J96.01 ACUTE RESPIRATORY FAILURE WITH HYPOXIA: Status: RESOLVED | Noted: 2018-02-14 | Resolved: 2018-02-17

## 2018-02-17 PROBLEM — Z72.0 TOBACCO ABUSE: Status: ACTIVE | Noted: 2018-02-17

## 2018-02-17 LAB
BACTERIA SPEC AEROBE CULT: NORMAL
BACTERIA SPEC AEROBE CULT: NORMAL
GLUCOSE BLDC GLUCOMTR-MCNC: 94 MG/DL (ref 70–130)
TROPONIN I SERPL-MCNC: <0.006 NG/ML

## 2018-02-17 PROCEDURE — 94799 UNLISTED PULMONARY SVC/PX: CPT

## 2018-02-17 PROCEDURE — 99239 HOSP IP/OBS DSCHRG MGMT >30: CPT | Performed by: PHYSICIAN ASSISTANT

## 2018-02-17 PROCEDURE — 63710000001 PREDNISONE PER 1 MG: Performed by: PHYSICIAN ASSISTANT

## 2018-02-17 PROCEDURE — 94640 AIRWAY INHALATION TREATMENT: CPT

## 2018-02-17 PROCEDURE — 25010000002 HYDRALAZINE PER 20 MG: Performed by: NURSE PRACTITIONER

## 2018-02-17 PROCEDURE — 94760 N-INVAS EAR/PLS OXIMETRY 1: CPT

## 2018-02-17 PROCEDURE — 82962 GLUCOSE BLOOD TEST: CPT

## 2018-02-17 PROCEDURE — 84484 ASSAY OF TROPONIN QUANT: CPT | Performed by: NURSE PRACTITIONER

## 2018-02-17 RX ORDER — DOXYCYCLINE HYCLATE 100 MG/1
100 CAPSULE ORAL EVERY 12 HOURS SCHEDULED
Qty: 9 CAPSULE | Refills: 0 | Status: SHIPPED | OUTPATIENT
Start: 2018-02-17 | End: 2018-02-22

## 2018-02-17 RX ORDER — BENZONATATE 200 MG/1
200 CAPSULE ORAL 3 TIMES DAILY PRN
Qty: 30 CAPSULE | Refills: 0 | Status: SHIPPED | OUTPATIENT
Start: 2018-02-17 | End: 2018-12-12

## 2018-02-17 RX ORDER — LISINOPRIL 5 MG/1
5 TABLET ORAL
Qty: 30 TABLET | Refills: 0 | Status: SHIPPED | OUTPATIENT
Start: 2018-02-18 | End: 2018-03-08 | Stop reason: SDUPTHER

## 2018-02-17 RX ORDER — LISINOPRIL 5 MG/1
5 TABLET ORAL
Status: DISCONTINUED | OUTPATIENT
Start: 2018-02-17 | End: 2018-02-17 | Stop reason: HOSPADM

## 2018-02-17 RX ORDER — HYDRALAZINE HYDROCHLORIDE 20 MG/ML
10 INJECTION INTRAMUSCULAR; INTRAVENOUS ONCE
Status: COMPLETED | OUTPATIENT
Start: 2018-02-17 | End: 2018-02-17

## 2018-02-17 RX ORDER — ESCITALOPRAM OXALATE 10 MG/1
TABLET ORAL
Qty: 45 TABLET | Refills: 0 | Status: SHIPPED | OUTPATIENT
Start: 2018-02-17 | End: 2018-03-01 | Stop reason: SINTOL

## 2018-02-17 RX ORDER — NICOTINE 21 MG/24HR
1 PATCH, TRANSDERMAL 24 HOURS TRANSDERMAL EVERY 24 HOURS
Qty: 14 PATCH | Refills: 0 | Status: SHIPPED | OUTPATIENT
Start: 2018-02-17 | End: 2018-03-01

## 2018-02-17 RX ORDER — DIAZEPAM 2 MG/1
2 TABLET ORAL ONCE
Status: COMPLETED | OUTPATIENT
Start: 2018-02-17 | End: 2018-02-17

## 2018-02-17 RX ORDER — PREDNISONE 10 MG/1
TABLET ORAL
Qty: 27 TABLET | Refills: 0 | Status: SHIPPED | OUTPATIENT
Start: 2018-02-17 | End: 2018-03-01

## 2018-02-17 RX ADMIN — IPRATROPIUM BROMIDE AND ALBUTEROL SULFATE 3 ML: .5; 3 SOLUTION RESPIRATORY (INHALATION) at 02:53

## 2018-02-17 RX ADMIN — DIAZEPAM 2 MG: 2 TABLET ORAL at 04:26

## 2018-02-17 RX ADMIN — PREDNISONE 40 MG: 20 TABLET ORAL at 08:45

## 2018-02-17 RX ADMIN — IPRATROPIUM BROMIDE AND ALBUTEROL SULFATE 3 ML: .5; 3 SOLUTION RESPIRATORY (INHALATION) at 09:18

## 2018-02-17 RX ADMIN — BUDESONIDE 0.5 MG: 0.5 INHALANT RESPIRATORY (INHALATION) at 09:22

## 2018-02-17 RX ADMIN — Medication 10 MG: at 03:04

## 2018-02-17 RX ADMIN — NICOTINE 1 PATCH: 14 PATCH TRANSDERMAL at 08:47

## 2018-02-17 RX ADMIN — LISINOPRIL 5 MG: 5 TABLET ORAL at 08:44

## 2018-02-17 RX ADMIN — DOXYCYCLINE HYCLATE 100 MG: 100 CAPSULE ORAL at 08:45

## 2018-02-17 RX ADMIN — ESCITALOPRAM OXALATE 10 MG: 20 TABLET ORAL at 08:45

## 2018-02-17 NOTE — DISCHARGE INSTRUCTIONS
Check your blood pressure 2-3 times per day. Always check your blood pressure before taking your blood pressure medication.     Goal blood pressure is 120/80 or better     DO NOT TAKE YOUR BLOOD PRESSURE MEDICATION IF YOUR BLOOD PRESSURE IS LESS THAN 110/65

## 2018-02-17 NOTE — DISCHARGE SUMMARY
[facility] Hospital Medicine Services  DISCHARGE SUMMARY    Patient Name: Micaela Reyes  : 1960  MRN: [id number]    Date of Admission: 2018  Date of Discharge:  18  Primary Care Physician: No Known Provider    Hospital Course     Presenting Problem:   Acute respiratory failure with hypoxia [J96.01]    Active Hospital Problems (** Indicates Principal Problem)    Diagnosis Date Noted   • Essential hypertension [I10] 2018   • Tobacco abuse [Z72.0] 2018   • Generalized anxiety disorder [F41.1] 2018   • Community acquired pneumonia of right lower lobe of lung [J18.1] 02/15/2018   • Steroid-induced hyperglycemia [R73.9, T38.0X5A] 02/15/2018   • Chronic obstructive pulmonary disease with acute exacerbation [J44.1] 2018      Resolved Hospital Problems    Diagnosis Date Noted Date Resolved   • **Acute respiratory failure with hypoxia [J96.01] 2018      Hospital Course:  Ms. Micaela Reyes is a 56yo female with PMH significant for tobacco abuse and generalized anxiety. She presented to [facility] ED on 18 for evaluation of a 1 week history of generalized body aches, weakness, nausea without vomiting and progressive dyspnea.     ED evaluation revealed stable labs. Influenza antigen and PCR both negative. ABG revealed hypoxia and she was hypoxic on exam. CXR showed airspace opacity in the right perihilar area and right lung base, most consistent with an acute inflammatory process. She was given IV Solumedrol and DuoNebs in the ED with marginal improvement and hospital medicine was asked to admit. UA also consistent with UTI.     She was treated for COPD exacerbation with IV Solumedrol (later transitioned to PO prednisone), scheduled nebulizer treatments and Doxycycline. She was also given IV Rocephin for UTI. Urine culture returned negative for infectious etiology. No further treatment for UTI at discharge. She was counseled extensively on  tobacco cessation and is interested in nicotine patches at discharge. She did experience steroid-induced hyperglycemia. Hemoglobin A1c was 5.8%    She was started on Lisinopril for hypertension. Encouraged the patient to purchase a blood pressure cuff and keep a log of her pressures to bring to PCP appointment. She may require further titration of her antihypertensive regimen.     She was also started on Lexapro for generalized anxiety. Dose will be increased to 20mg after 7 days of therapy. PCP to follow up on efficacy of this regimen.     Ms. Reyes is improved and will return home in stable condition on 2/17/18.   She has been weaned off of O2.   An ambulatory referral to pulmonology has been made.     Day of Discharge     HPI:   Feeling better today. BP ran high all night, had to get something IV to bring it down. No chest pain, dyspnea is improving. She'd like to go home today.     Review of Systems  Gen- No fevers, chills  CV- No chest pain, palpitations  Resp- (+) cough and dyspnea  GI- No N/V/D, abd pain    Otherwise ROS is negative except as mentioned in the HPI.    Vital Signs:   Temp:  [97.5 °F (36.4 °C)-98.7 °F (37.1 °C)] 97.5 °F (36.4 °C)  Heart Rate:  [] 81  Resp:  [14-20] 20  BP: (147-194)/() 161/89     Physical Exam:  Constitutional: No acute distress, awake, alert and conversant. Gavino complexion.   HENT: NCAT, mucous membranes moist  Respiratory: Diffuse wheezing bilaterally without overt rales or rhonchi. Normal respiratory effort. Easily winded during conversation. She is on room air today.    Cardiovascular: RRR, no murmurs, rubs, or gallops, palpable pedal pulses bilaterally  Gastrointestinal: Positive bowel sounds, soft, nontender, nondistended  Musculoskeletal: No bilateral ankle edema  Psychiatric: Appropriate affect, cooperative  Neurologic: Oriented x 3, strength symmetric in all extremities, Cranial Nerves grossly intact to confrontation, speech clear  Skin: No  rashes    Pertinent  and/or Most Recent Results       Results from last 7 days  Lab Units 02/14/18  0920   WBC 10*3/mm3 8.92   HEMOGLOBIN g/dL 14.4   HEMATOCRIT % 42.8   PLATELETS 10*3/mm3 163   SODIUM mmol/L 133   POTASSIUM mmol/L 4.3   CHLORIDE mmol/L 102   CO2 mmol/L 24.0   BUN mg/dL 11   CREATININE mg/dL 0.80   GLUCOSE mg/dL 121*   CALCIUM mg/dL 9.8       Results from last 7 days  Lab Units 02/14/18  0920   BILIRUBIN mg/dL 0.3   ALK PHOS U/L 80   ALT (SGPT) U/L 33   AST (SGOT) U/L 27       Results from last 7 days  Lab Units 02/17/18  0341 02/16/18  1024   HEMOGLOBIN A1C %  --  5.80*   TROPONIN I ng/mL <0.006  --      Brief Urine Lab Results  (Last result in the past 365 days)      Color   Clarity   Blood   Leuk Est   Nitrite   Protein   CREAT   Urine HCG        02/15/18 1015 Yellow Clear Small (1+)(A) Negative Positive(A) Trace(A)             Microbiology Results Abnormal     Procedure Component Value - Date/Time    Urine Culture - Urine, Urine, Clean Catch [[id number]] Collected:  02/15/18 1015    Lab Status:  Final result Specimen:  Urine from Urine, Clean Catch Updated:  02/17/18 0842     Urine Culture --      60,000-70,000 CFU/mL Normal Urogenital Lia    Influenza A & B, RT PCR - Swab, Nasopharynx [[id number]]  (Normal) Collected:  02/14/18 1128    Lab Status:  Final result Specimen:  Swab from Nasopharynx Updated:  02/14/18 1320     Influenza A PCR Not Detected     Influenza B PCR Not Detected    Influenza Antigen, Rapid - Swab, Nasopharynx [[id number]]  (Normal) Collected:  02/14/18 0912    Lab Status:  Final result Specimen:  Swab from Nasopharynx Updated:  02/14/18 0933     Influenza A Ag, EIA Negative     Influenza B Ag, EIA Negative        Imaging Results (all)     Procedure Component Value Units Date/Time    XR Chest 1 View [[id number]] Collected:  02/14/18 1048     Updated:  02/14/18 1155    Narrative:       EXAMINATION: XR CHEST 1 VW-      INDICATION: Simple sepsis triage protocol.      COMPARISON:  None.     FINDINGS:   1. There is ill-defined airspace opacity right lung base. Although it is  not densely consolidated but appears acute and active. There is  increased density and prominence to the markings in the right hilum.  2. The left lung is clear. The heart is normal. There is no free fluid.            Impression:       Airspace opacity right hilum perihilar area and right lung  base, likely acute inflammatory process. Otherwise, the left lung is  clear and there is no free fluid.     D:  02/14/2018  E:  02/14/2018     This report was finalized on 2/14/2018 11:53 AM by Dr. Raji Chamberlain MD.           Discharge Details      Micaela Reyes   Home Medication Instructions BYRON:[id number]    Printed on:02/17/18 1028   Medication Information                      benzonatate (TESSALON) 200 MG capsule  Take 1 capsule by mouth 3 (Three) Times a Day As Needed for Cough.             doxycycline (VIBRAMYCIN) 100 MG capsule  Take 1 capsule by mouth Every 12 (Twelve) Hours for 9 doses. Indications: Pneumonia, Upper Respiratory Tract Infection             escitalopram (LEXAPRO) 10 MG tablet  1 tablet by mouth daily x 7 days, then increase to 2 tablets daily             lisinopril (PRINIVIL,ZESTRIL) 5 MG tablet  Take 1 tablet by mouth Daily.             nicotine (NICODERM CQ) 14 MG/24HR patch  Place 1 patch on the skin Daily.             predniSONE (DELTASONE) 10 MG tablet  Take 4 tabs by mouth daily x3 days, then 3 tabs daily x3 days, then 2 tabs daily x2 days, then 1 tab daily x2 days then stop               Discharge Disposition:  Home or Self Care    Discharge Diet:  Diet Instructions     Diet: Regular, Cardiac; Thin       Discharge Diet:   Regular  Cardiac      Fluid Consistency:  Thin               Discharge Activity:   Activity Instructions     Activity as Tolerated                   Future Appointments  Date Time Provider Department Center   3/1/2018 9:45 AM JULIANNE [facility] None     Additional  Instructions for the Follow-ups that You Need to Schedule     Discharge Follow-up with PCP    As directed    Follow Up Details:  PCP follow up arranged for Thursday March 1st at 9:45am           Discharge Follow-up with Specialty: A referral to pulmonology has been made    As directed    Specialty:  A referral to pulmonology has been made                   Time Spent on Discharge:  45 minutes    Electronically signed by Mariel Rainey PA-C, 02/17/18, 10:28 AM.

## 2018-02-17 NOTE — PLAN OF CARE
Problem: Respiratory Insufficiency (Adult)  Goal: Acid/Base Balance  Outcome: Ongoing (interventions implemented as appropriate)    Goal: Effective Ventilation  Outcome: Ongoing (interventions implemented as appropriate)      Problem: Patient Care Overview (Adult)  Goal: Plan of Care Review  Outcome: Ongoing (interventions implemented as appropriate)    Goal: Adult Individualization and Mutuality  Outcome: Ongoing (interventions implemented as appropriate)    Goal: Discharge Needs Assessment  Outcome: Ongoing (interventions implemented as appropriate)      Problem: Hyperglycemia, Persistent (Adult)  Goal: Signs and Symptoms of Listed Potential Problems Will be Absent or Manageable (Hyperglycemia, Persistent)  Outcome: Ongoing (interventions implemented as appropriate)      Problem: Pneumonia (Adult)  Goal: Signs and Symptoms of Listed Potential Problems Will be Absent or Manageable (Pneumonia)  Outcome: Ongoing (interventions implemented as appropriate)   02/17/18 0456   Pneumonia   Problems Assessed (Pneumonia) all

## 2018-02-19 ENCOUNTER — TRANSITIONAL CARE MANAGEMENT TELEPHONE ENCOUNTER (OUTPATIENT)
Dept: INTERNAL MEDICINE | Facility: CLINIC | Age: 58
End: 2018-02-19

## 2018-03-01 ENCOUNTER — TELEPHONE (OUTPATIENT)
Dept: INTERNAL MEDICINE | Facility: CLINIC | Age: 58
End: 2018-03-01

## 2018-03-01 ENCOUNTER — OFFICE VISIT (OUTPATIENT)
Dept: INTERNAL MEDICINE | Facility: CLINIC | Age: 58
End: 2018-03-01

## 2018-03-01 ENCOUNTER — HOSPITAL ENCOUNTER (OUTPATIENT)
Dept: GENERAL RADIOLOGY | Facility: HOSPITAL | Age: 58
Discharge: HOME | End: 2018-03-01
Admitting: NURSE PRACTITIONER

## 2018-03-01 VITALS
WEIGHT: 165 LBS | HEART RATE: 76 BPM | RESPIRATION RATE: 16 BRPM | DIASTOLIC BLOOD PRESSURE: 80 MMHG | TEMPERATURE: 97.5 F | OXYGEN SATURATION: 99 % | HEIGHT: 63 IN | SYSTOLIC BLOOD PRESSURE: 128 MMHG | BODY MASS INDEX: 29.23 KG/M2

## 2018-03-01 DIAGNOSIS — N39.0 URINARY TRACT INFECTION WITHOUT HEMATURIA, SITE UNSPECIFIED: ICD-10-CM

## 2018-03-01 DIAGNOSIS — Z72.0 TOBACCO ABUSE: ICD-10-CM

## 2018-03-01 DIAGNOSIS — H53.9 VISION DISTURBANCE: ICD-10-CM

## 2018-03-01 DIAGNOSIS — J44.1 CHRONIC OBSTRUCTIVE PULMONARY DISEASE WITH ACUTE EXACERBATION (HCC): ICD-10-CM

## 2018-03-01 DIAGNOSIS — F41.1 GENERALIZED ANXIETY DISORDER: ICD-10-CM

## 2018-03-01 DIAGNOSIS — J44.1 CHRONIC OBSTRUCTIVE PULMONARY DISEASE WITH ACUTE EXACERBATION (HCC): Primary | ICD-10-CM

## 2018-03-01 DIAGNOSIS — J18.9 COMMUNITY ACQUIRED PNEUMONIA OF RIGHT LOWER LOBE OF LUNG: ICD-10-CM

## 2018-03-01 LAB
ALBUMIN SERPL-MCNC: 4.2 G/DL (ref 3.2–4.8)
ALBUMIN/GLOB SERPL: 1.6 G/DL (ref 1.5–2.5)
ALP SERPL-CCNC: 74 U/L (ref 25–100)
ALT SERPL W P-5'-P-CCNC: 47 U/L (ref 7–40)
ANION GAP SERPL CALCULATED.3IONS-SCNC: 6 MMOL/L (ref 3–11)
AST SERPL-CCNC: 24 U/L (ref 0–33)
BASOPHILS # BLD AUTO: 0.03 10*3/MM3 (ref 0–0.2)
BASOPHILS NFR BLD AUTO: 0.3 % (ref 0–1)
BILIRUB BLD-MCNC: NEGATIVE MG/DL
BILIRUB SERPL-MCNC: 0.3 MG/DL (ref 0.3–1.2)
BUN BLD-MCNC: 15 MG/DL (ref 9–23)
BUN/CREAT SERPL: 21.4 (ref 7–25)
CALCIUM SPEC-SCNC: 9 MG/DL (ref 8.7–10.4)
CHLORIDE SERPL-SCNC: 103 MMOL/L (ref 99–109)
CLARITY, POC: ABNORMAL
CO2 SERPL-SCNC: 27 MMOL/L (ref 20–31)
COLOR UR: YELLOW
CREAT BLD-MCNC: 0.7 MG/DL (ref 0.6–1.3)
DEPRECATED RDW RBC AUTO: 54.3 FL (ref 37–54)
EOSINOPHIL # BLD AUTO: 0.19 10*3/MM3 (ref 0–0.3)
EOSINOPHIL NFR BLD AUTO: 2.1 % (ref 0–3)
ERYTHROCYTE [DISTWIDTH] IN BLOOD BY AUTOMATED COUNT: 15.2 % (ref 11.3–14.5)
EXPIRATION DATE: ABNORMAL
GFR SERPL CREATININE-BSD FRML MDRD: 87 ML/MIN/1.73
GLOBULIN UR ELPH-MCNC: 2.6 GM/DL
GLUCOSE BLD-MCNC: 90 MG/DL (ref 70–100)
GLUCOSE UR STRIP-MCNC: NEGATIVE MG/DL
HCT VFR BLD AUTO: 40 % (ref 34.5–44)
HGB BLD-MCNC: 12.7 G/DL (ref 11.5–15.5)
IMM GRANULOCYTES # BLD: 0.13 10*3/MM3 (ref 0–0.03)
IMM GRANULOCYTES NFR BLD: 1.4 % (ref 0–0.6)
KETONES UR QL: NEGATIVE
LEUKOCYTE EST, POC: ABNORMAL
LYMPHOCYTES # BLD AUTO: 1.91 10*3/MM3 (ref 0.6–4.8)
LYMPHOCYTES NFR BLD AUTO: 21.2 % (ref 24–44)
Lab: ABNORMAL
MCH RBC QN AUTO: 30.8 PG (ref 27–31)
MCHC RBC AUTO-ENTMCNC: 31.8 G/DL (ref 32–36)
MCV RBC AUTO: 97.1 FL (ref 80–99)
MONOCYTES # BLD AUTO: 0.76 10*3/MM3 (ref 0–1)
MONOCYTES NFR BLD AUTO: 8.5 % (ref 0–12)
NEUTROPHILS # BLD AUTO: 5.97 10*3/MM3 (ref 1.5–8.3)
NEUTROPHILS NFR BLD AUTO: 66.5 % (ref 41–71)
NITRITE UR-MCNC: NEGATIVE MG/ML
PH UR: 5 [PH] (ref 5–8)
PLATELET # BLD AUTO: 236 10*3/MM3 (ref 150–450)
PMV BLD AUTO: 10.3 FL (ref 6–12)
POTASSIUM BLD-SCNC: 4.7 MMOL/L (ref 3.5–5.5)
PROT SERPL-MCNC: 6.8 G/DL (ref 5.7–8.2)
PROT UR STRIP-MCNC: NEGATIVE MG/DL
RBC # BLD AUTO: 4.12 10*6/MM3 (ref 3.89–5.14)
RBC # UR STRIP: ABNORMAL /UL
SODIUM BLD-SCNC: 136 MMOL/L (ref 132–146)
SP GR UR: 1.01 (ref 1–1.03)
TSH SERPL DL<=0.05 MIU/L-ACNC: 2.85 MIU/ML (ref 0.35–5.35)
UROBILINOGEN UR QL: NORMAL
WBC NRBC COR # BLD: 8.99 10*3/MM3 (ref 3.5–10.8)

## 2018-03-01 PROCEDURE — 84443 ASSAY THYROID STIM HORMONE: CPT | Performed by: NURSE PRACTITIONER

## 2018-03-01 PROCEDURE — 80053 COMPREHEN METABOLIC PANEL: CPT | Performed by: NURSE PRACTITIONER

## 2018-03-01 PROCEDURE — 81002 URINALYSIS NONAUTO W/O SCOPE: CPT | Performed by: NURSE PRACTITIONER

## 2018-03-01 PROCEDURE — 99495 TRANSJ CARE MGMT MOD F2F 14D: CPT | Performed by: NURSE PRACTITIONER

## 2018-03-01 PROCEDURE — 87086 URINE CULTURE/COLONY COUNT: CPT | Performed by: NURSE PRACTITIONER

## 2018-03-01 PROCEDURE — 71046 X-RAY EXAM CHEST 2 VIEWS: CPT

## 2018-03-01 PROCEDURE — 36415 COLL VENOUS BLD VENIPUNCTURE: CPT | Performed by: NURSE PRACTITIONER

## 2018-03-01 PROCEDURE — 85025 COMPLETE CBC W/AUTO DIFF WBC: CPT | Performed by: NURSE PRACTITIONER

## 2018-03-01 RX ORDER — FLUOXETINE HYDROCHLORIDE 20 MG/1
20 CAPSULE ORAL DAILY
Qty: 30 CAPSULE | Refills: 0 | Status: SHIPPED | OUTPATIENT
Start: 2018-03-01 | End: 2018-03-20 | Stop reason: SDUPTHER

## 2018-03-01 RX ORDER — ALBUTEROL SULFATE 90 UG/1
2 AEROSOL, METERED RESPIRATORY (INHALATION) EVERY 4 HOURS PRN
Qty: 1 INHALER | Refills: 3 | Status: SHIPPED | OUTPATIENT
Start: 2018-03-01 | End: 2018-03-20 | Stop reason: SDUPTHER

## 2018-03-01 NOTE — TELEPHONE ENCOUNTER
----- Message from Zulay Vela sent at 3/1/2018  1:29 PM EST -----  Pt [phone]  Refill on nicotine patches , pt just finishing her first 14 days of patches   [facility] [place]

## 2018-03-01 NOTE — PATIENT INSTRUCTIONS
Chronic Obstructive Pulmonary Disease  Chronic obstructive pulmonary disease (COPD) is a common lung condition in which airflow from the lungs is limited. COPD is a general term that can be used to describe many different lung problems that limit airflow, including both chronic bronchitis and emphysema. If you have COPD, your lung function will probably never return to normal, but there are measures you can take to improve lung function and make yourself feel better.  What are the causes?  · Smoking (common).  · Exposure to secondhand smoke.  · Genetic problems.  · Chronic inflammatory lung diseases or recurrent infections.  What are the signs or symptoms?  · Shortness of breath, especially with physical activity.  · Deep, persistent (chronic) cough with a large amount of thick mucus.  · Wheezing.  · Rapid breaths (tachypnea).  · Gray or bluish discoloration (cyanosis) of the skin, especially in your fingers, toes, or lips.  · Fatigue.  · Weight loss.  · Frequent infections or episodes when breathing symptoms become much worse (exacerbations).  · Chest tightness.  How is this diagnosed?  Your health care provider will take a medical history and perform a physical examination to diagnose COPD. Additional tests for COPD may include:  · Lung (pulmonary) function tests.  · Chest X-ray.  · CT scan.  · Blood tests.  How is this treated?  Treatment for COPD may include:  · Inhaler and nebulizer medicines. These help manage the symptoms of COPD and make your breathing more comfortable.  · Supplemental oxygen. Supplemental oxygen is only helpful if you have a low oxygen level in your blood.  · Exercise and physical activity. These are beneficial for nearly all people with COPD.  · Lung surgery or transplant.  · Nutrition therapy to gain weight, if you are underweight.  · Pulmonary rehabilitation. This may involve working with a team of health care providers and specialists, such as respiratory, occupational, and physical  therapists.  Follow these instructions at home:  · Take all medicines (inhaled or pills) as directed by your health care provider.  · Avoid over-the-counter medicines or cough syrups that dry up your airway (such as antihistamines) and slow down the elimination of secretions unless instructed otherwise by your health care provider.  · If you are a smoker, the most important thing that you can do is stop smoking. Continuing to smoke will cause further lung damage and breathing trouble. Ask your health care provider for help with quitting smoking. He or she can direct you to community resources or hospitals that provide support.  · Avoid exposure to irritants such as smoke, chemicals, and fumes that aggravate your breathing.  · Use oxygen therapy and pulmonary rehabilitation if directed by your health care provider. If you require home oxygen therapy, ask your health care provider whether you should purchase a pulse oximeter to measure your oxygen level at home.  · Avoid contact with individuals who have a contagious illness.  · Avoid extreme temperature and humidity changes.  · Eat healthy foods. Eating smaller, more frequent meals and resting before meals may help you maintain your strength.  · Stay active, but balance activity with periods of rest. Exercise and physical activity will help you maintain your ability to do things you want to do.  · Preventing infection and hospitalization is very important when you have COPD. Make sure to receive all the vaccines your health care provider recommends, especially the pneumococcal and influenza vaccines. Ask your health care provider whether you need a pneumonia vaccine.  · Learn and use relaxation techniques to manage stress.  · Learn and use controlled breathing techniques as directed by your health care provider. Controlled breathing techniques include:  1. Pursed lip breathing. Start by breathing in (inhaling) through your nose for 1 second. Then, purse your lips as  if you were going to whistle and breathe out (exhale) through the pursed lips for 2 seconds.  2. Diaphragmatic breathing. Start by putting one hand on your abdomen just above your waist. Inhale slowly through your nose. The hand on your abdomen should move out. Then purse your lips and exhale slowly. You should be able to feel the hand on your abdomen moving in as you exhale.  · Learn and use controlled coughing to clear mucus from your lungs. Controlled coughing is a series of short, progressive coughs. The steps of controlled coughing are:  1. Lean your head slightly forward.  2. Breathe in deeply using diaphragmatic breathing.  3. Try to hold your breath for 3 seconds.  4. Keep your mouth slightly open while coughing twice.  5. Spit any mucus out into a tissue.  6. Rest and repeat the steps once or twice as needed.  Contact a health care provider if:  · You are coughing up more mucus than usual.  · There is a change in the color or thickness of your mucus.  · Your breathing is more labored than usual.  · Your breathing is faster than usual.  Get help right away if:  · You have shortness of breath while you are resting.  · You have shortness of breath that prevents you from:  ¨ Being able to talk.  ¨ Performing your usual physical activities.  · You have chest pain lasting longer than 5 minutes.  · Your skin color is more cyanotic than usual.  · You measure low oxygen saturations for longer than 5 minutes with a pulse oximeter.  This information is not intended to replace advice given to you by your health care provider. Make sure you discuss any questions you have with your health care provider.  Document Released: 09/27/2006 Document Revised: 05/25/2017 Document Reviewed: 08/14/2014  Elsevier Interactive Patient Education © 2017 [employer]

## 2018-03-01 NOTE — PROGRESS NOTES
Transitional Care Follow Up Visit  Subjective     Micaela Reyes is a 57 y.o. female who presents for a transitional care management visit.    Within 48 business hours after discharge our office contacted her via telephone to coordinate her care and needs.      I reviewed and discussed the details of that call along with the discharge summary, hospital problems, inpatient lab results, inpatient diagnostic studies, and consultation reports with Micaela.     Current outpatient and discharge medications have been reconciled for the patient.    Date of TCM Phone Call 2/19/2018   [facility]   Date of Admission 2/14/2018   Date of Discharge 2/17/2018   Discharge Disposition Home or Self Care     Risk for Readmission (LACE) Score: 8    History of Present Illness present for transition of care visit, post hospital stay for pneumonia.  Complains of productive cough with mild wheezing that occurs at night that is worsened when she becomes hot and resolves on its own. Patient reports unaware of COPD diagnosis until hospital stay. Patient requests inhaler. Patient reports feeling better since beginning nicotine patches and smoking cessation, but reports exposure to second hand smoke.    Complains of vision changes-blurriness since beginning Celexa that began after initiation of medicine without alleviating or worsening factors.     The following portions of the patient's history were reviewed and updated as appropriate: allergies, current medications, past family history, past medical history, past social history, past surgical history and problem list.    Review of Systems   Constitutional: Negative for chills, fatigue and fever.   HENT: Negative for congestion, dental problem, mouth sores, nosebleeds, postnasal drip, rhinorrhea, sinus pain, sinus pressure, sneezing and sore throat.         Denies snoring.   Eyes: Negative for pain, discharge, redness and itching.   Respiratory: Positive for cough and wheezing.  Negative for shortness of breath.         Productive cough with wheezing at night   Cardiovascular: Negative for chest pain, palpitations and leg swelling.        No WILKINSON, orthopnea, PND, or claudication.   Gastrointestinal: Negative for abdominal distention, abdominal pain, blood in stool, diarrhea, nausea and vomiting.   Endocrine: Negative for cold intolerance, heat intolerance, polydipsia and polyuria.   Genitourinary: Negative for difficulty urinating, dysuria, flank pain, frequency, hematuria and urgency.   Musculoskeletal: Negative for arthralgias, gait problem, joint swelling and myalgias.   Skin: Negative for color change, rash and wound.   Neurological: Negative for dizziness, syncope, weakness, light-headedness, numbness and headaches.   Hematological: Negative for adenopathy. Does not bruise/bleed easily.       Objective   Physical Exam   Constitutional: She is oriented to person, place, and time. She appears well-developed and well-nourished. She is cooperative. She is easily aroused.  Non-toxic appearance. She does not have a sickly appearance. She does not appear ill. No distress.   HENT:   Head: Normocephalic and atraumatic. Head is without abrasion. Hair is normal.   Right Ear: Hearing, tympanic membrane, external ear and ear canal normal. No foreign bodies. Tympanic membrane is not perforated and not erythematous.   Left Ear: Hearing, tympanic membrane, external ear and ear canal normal. No foreign bodies. Tympanic membrane is not perforated and not erythematous.   Nose: Nose normal. No mucosal edema, rhinorrhea or septal deviation. No epistaxis.  No foreign bodies.   Mouth/Throat: Oropharynx is clear and moist. No oral lesions. Normal dentition.   Eyes: Conjunctivae and lids are normal. Pupils are equal, round, and reactive to light. Right eye exhibits no discharge. Left eye exhibits no discharge. Right conjunctiva is not injected. Left conjunctiva is not injected. No scleral icterus.   Neck:  Normal range of motion and full passive range of motion without pain. Neck supple. No edema and normal range of motion present. No thyroid mass and no thyromegaly present.   Cardiovascular: Normal rate, regular rhythm and normal heart sounds.  Exam reveals no gallop and no friction rub.    No murmur heard.  Pulmonary/Chest: Effort normal. No accessory muscle usage. She has wheezes. She has no rhonchi. She has no rales. She exhibits no tenderness.   Mild wheezing right upper anterior   Abdominal: Soft. Bowel sounds are normal. She exhibits no distension. There is no hepatosplenomegaly or hepatomegaly. There is no tenderness. There is no CVA tenderness.   Musculoskeletal: Normal range of motion. She exhibits no edema, tenderness or deformity.   Lymphadenopathy:     She has no cervical adenopathy.   Neurological: She is alert, oriented to person, place, and time and easily aroused. She has normal reflexes. No cranial nerve deficit. Coordination normal.   Muscle strength 5/5 and equal throughout.   Skin: Skin is warm, dry and intact. No abrasion and no rash noted. She is not diaphoretic. No cyanosis or erythema. Nails show no clubbing.   Psychiatric: She has a normal mood and affect. Her speech is normal and behavior is normal.   Nursing note and vitals reviewed.      Assessment/Plan   Micaela was seen today for establish care.    Diagnoses and all orders for this visit:    Chronic obstructive pulmonary disease with acute exacerbation  -     Comprehensive Metabolic Panel  -     CBC & Differential  -     TSH  -     albuterol (PROVENTIL HFA;VENTOLIN HFA) 108 (90 Base) MCG/ACT inhaler; Inhale 2 puffs Every 4 (Four) Hours As Needed for Wheezing.  -     XR Chest PA & Lateral; Future  -     CBC Auto Differential    Urinary tract infection without hematuria, site unspecified  -     POC Urinalysis Dipstick  -     Urine Culture - Urine, Urine, Clean Catch    Community acquired pneumonia of right lower lobe of lung  -      Comprehensive Metabolic Panel  -     CBC & Differential  -     TSH  -     XR Chest PA & Lateral; Future  -     CBC Auto Differential    Generalized anxiety disorder  -     TSH  -     FLUoxetine (PROZAC) 20 MG capsule; Take 1 capsule by mouth Daily.    Tobacco abuse    Vision disturbance  -     TSH          Transitional Care Management Certification  I certify that the following are true:  1. Communication was made within 2 business days of discharge.  2. Complexity of Medical Decision Making is moderate.  3. Face to face visit occurred within 12 days.    *Note: 52148 is for high complexity patients with a face to face visit within 7 days of discharge.  48206 is for high complexity patients with a face to face on days 8-14 post discharge or medium complexity with face to face visit within 14 days post discharge.

## 2018-03-05 NOTE — TELEPHONE ENCOUNTER
S/w patient and informed her that her rx had been sent to the pharmacy.  Patient stated she had already picked them up.

## 2018-03-06 LAB
BACTERIA SPEC AEROBE CULT: NORMAL
BACTERIA SPEC AEROBE CULT: NORMAL

## 2018-03-06 RX ORDER — NITROFURANTOIN 25; 75 MG/1; MG/1
100 CAPSULE ORAL EVERY 12 HOURS SCHEDULED
Qty: 14 CAPSULE | Refills: 0 | Status: SHIPPED | OUTPATIENT
Start: 2018-03-06 | End: 2018-04-12

## 2018-03-08 ENCOUNTER — OFFICE VISIT (OUTPATIENT)
Dept: INTERNAL MEDICINE | Facility: CLINIC | Age: 58
End: 2018-03-08

## 2018-03-08 VITALS
HEART RATE: 78 BPM | HEIGHT: 63 IN | DIASTOLIC BLOOD PRESSURE: 82 MMHG | SYSTOLIC BLOOD PRESSURE: 122 MMHG | TEMPERATURE: 97.1 F | WEIGHT: 165 LBS | BODY MASS INDEX: 29.23 KG/M2

## 2018-03-08 DIAGNOSIS — I10 ESSENTIAL HYPERTENSION: ICD-10-CM

## 2018-03-08 DIAGNOSIS — N39.0 URINARY TRACT INFECTION WITHOUT HEMATURIA, SITE UNSPECIFIED: ICD-10-CM

## 2018-03-08 DIAGNOSIS — Z72.0 TOBACCO ABUSE: ICD-10-CM

## 2018-03-08 DIAGNOSIS — J44.1 CHRONIC OBSTRUCTIVE PULMONARY DISEASE WITH ACUTE EXACERBATION (HCC): Primary | ICD-10-CM

## 2018-03-08 DIAGNOSIS — F41.1 GENERALIZED ANXIETY DISORDER: ICD-10-CM

## 2018-03-08 PROBLEM — R73.9 STEROID-INDUCED HYPERGLYCEMIA: Status: RESOLVED | Noted: 2018-02-15 | Resolved: 2018-03-08

## 2018-03-08 PROBLEM — T38.0X5A STEROID-INDUCED HYPERGLYCEMIA: Status: RESOLVED | Noted: 2018-02-15 | Resolved: 2018-03-08

## 2018-03-08 PROCEDURE — 99214 OFFICE O/P EST MOD 30 MIN: CPT | Performed by: NURSE PRACTITIONER

## 2018-03-08 RX ORDER — BUDESONIDE AND FORMOTEROL FUMARATE DIHYDRATE 160; 4.5 UG/1; UG/1
2 AEROSOL RESPIRATORY (INHALATION)
Qty: 1 INHALER | Refills: 12 | Status: SHIPPED | OUTPATIENT
Start: 2018-03-08 | End: 2018-03-20 | Stop reason: SDUPTHER

## 2018-03-08 RX ORDER — LISINOPRIL 5 MG/1
5 TABLET ORAL
Qty: 30 TABLET | Refills: 3 | Status: SHIPPED | OUTPATIENT
Start: 2018-03-08 | End: 2018-03-20 | Stop reason: SDUPTHER

## 2018-03-08 NOTE — PROGRESS NOTES
Subjective:    Micaela Reyes is a 57 y.o. female.     Chief Complaint   Patient presents with   • COPD     Discuss Labs and Xray- 1wk fu       History of Present Illness   Patient here to follow up on COPD and discuss labs and xray ordered at transition of care visit. Patient reports vision disturbance resolved after Celexa discontinued.     COPD diagnosed at hospital stay per patient. Associated with intermittent productive cough, that is worsened when she becomes hot. Cough is relieved with inhaler. COPD had been exacerbated with recent pneumonia and described as improving. Patient denies fever.     Patient reports hypertension also diagnosed at recent hospital stay and has been stable with current medication. Patient requests refill.  Patient reports blood pressure has had fluctuating readings in the past with intermittent elevations. Medication has stabilized readings. Patient denies chest pain or swelling.    Patient complains of nicotine addiction for years. Patient reports no smoking since beginning patches during hospital stay. Patient continues to use Nicotine patches without complaints.     Patient reports urine has strong odor since last visit. Patient denies painful urination or increased frequency, but feels like she has an infection. Patient plans on picking up antibiotic and initiating today.    Anxiety stable with medication change from Celexa to Prozac.Patient reports vision disturbance resolved after Celexa discontinued.        Current Outpatient Prescriptions:   •  albuterol (PROVENTIL HFA;VENTOLIN HFA) 108 (90 Base) MCG/ACT inhaler, Inhale 2 puffs Every 4 (Four) Hours As Needed for Wheezing., Disp: 1 inhaler, Rfl: 3  •  benzonatate (TESSALON) 200 MG capsule, Take 1 capsule by mouth 3 (Three) Times a Day As Needed for Cough., Disp: 30 capsule, Rfl: 0  •  FLUoxetine (PROZAC) 20 MG capsule, Take 1 capsule by mouth Daily., Disp: 30 capsule, Rfl: 0  •  lisinopril (PRINIVIL,ZESTRIL) 5 MG tablet, Take 1  tablet by mouth Daily., Disp: 30 tablet, Rfl: 3  •  nicotine (NICODERM CQ) 7 MG/24HR patch, Place 1 patch on the skin Daily., Disp: 14 patch, Rfl: 0  •  nitrofurantoin, macrocrystal-monohydrate, (MACROBID) 100 MG capsule, Take 1 capsule by mouth Every 12 (Twelve) Hours., Disp: 14 capsule, Rfl: 0  •  budesonide-formoterol (SYMBICORT) 160-4.5 MCG/ACT inhaler, Inhale 2 puffs 2 (Two) Times a Day for 30 days., Disp: 1 inhaler, Rfl: 12     The following portions of the patient's history were reviewed and updated as appropriate: allergies, current medications, past family history, past medical history, past social history, past surgical history and problem list.    Review of Systems   Constitutional: Negative for chills, fatigue and fever.   HENT: Negative for congestion, dental problem, mouth sores, nosebleeds, postnasal drip, rhinorrhea, sinus pain, sinus pressure, sneezing and sore throat.         Denies snoring.   Eyes: Negative for pain, discharge, redness, itching and visual disturbance.   Respiratory: Positive for cough. Negative for shortness of breath and wheezing.         COPD   Cardiovascular: Negative for chest pain, palpitations and leg swelling.        No WILKINSON, orthopnea, PND, or claudication.   Gastrointestinal: Negative for abdominal distention, abdominal pain, blood in stool, diarrhea, nausea and vomiting.   Endocrine: Negative.  Negative for cold intolerance, heat intolerance, polydipsia and polyuria.   Genitourinary: Negative for difficulty urinating, dyspareunia, dysuria, enuresis, flank pain, frequency, hematuria and urgency.        Malodorous urine   Musculoskeletal: Negative for arthralgias, gait problem, joint swelling and myalgias.   Skin: Negative for color change, rash and wound.   Neurological: Negative for dizziness, syncope, weakness, light-headedness, numbness and headaches.   Hematological: Negative.  Negative for adenopathy. Does not bruise/bleed easily.   Psychiatric/Behavioral: The patient  "is nervous/anxious.        Objective:    /82 (BP Location: Right arm, Patient Position: Sitting, Cuff Size: Adult)  Pulse 78  Temp 97.1 °F (36.2 °C) (Temporal Artery )   Ht 160 cm (62.99\")  Wt 74.8 kg (165 lb)  BMI 29.24 kg/m2    Physical Exam   Constitutional: She is oriented to person, place, and time. She appears well-developed and well-nourished. She is cooperative. She is easily aroused.  Non-toxic appearance. She does not have a sickly appearance. She does not appear ill. No distress.   HENT:   Head: Normocephalic and atraumatic. Head is without abrasion. Hair is normal.   Right Ear: Hearing, tympanic membrane, external ear and ear canal normal. No foreign bodies. Tympanic membrane is not perforated and not erythematous.   Left Ear: Hearing, tympanic membrane, external ear and ear canal normal. No foreign bodies. Tympanic membrane is not perforated and not erythematous.   Nose: Nose normal. No mucosal edema, rhinorrhea or septal deviation. No epistaxis.  No foreign bodies.   Mouth/Throat: Oropharynx is clear and moist and mucous membranes are normal. No oral lesions. Normal dentition.   Eyes: Conjunctivae and lids are normal. Pupils are equal, round, and reactive to light. Right eye exhibits no discharge. Left eye exhibits no discharge. Right conjunctiva is not injected. Left conjunctiva is not injected. No scleral icterus.   Neck: Normal range of motion and full passive range of motion without pain. Neck supple. No edema and normal range of motion present. No thyroid mass and no thyromegaly present.   Cardiovascular: Normal rate, regular rhythm and normal heart sounds.  Exam reveals no gallop and no friction rub.    No murmur heard.  Pulmonary/Chest: Breath sounds normal. No accessory muscle usage. She has no rhonchi. She has no rales. She exhibits no tenderness.   Respiratory effort normal at rest, but mildly increases with conversation   Abdominal: Soft. Bowel sounds are normal. She exhibits no " distension. There is no hepatosplenomegaly or hepatomegaly. There is no tenderness. There is no CVA tenderness.   Musculoskeletal: Normal range of motion. She exhibits no edema, tenderness or deformity.   Lymphadenopathy:     She has no cervical adenopathy.   Neurological: She is alert, oriented to person, place, and time and easily aroused. She has normal reflexes. No cranial nerve deficit. Coordination normal.   Skin: Skin is warm, dry and intact. No abrasion and no rash noted. She is not diaphoretic. No cyanosis or erythema.   Psychiatric: She has a normal mood and affect. Her speech is normal and behavior is normal.   Nursing note and vitals reviewed.      Assessment/Plan:    Micaela was seen today for copd.    Diagnoses and all orders for this visit:    Chronic obstructive pulmonary disease with acute exacerbation  -     Ambulatory Referral to Pulmonology  -     budesonide-formoterol (SYMBICORT) 160-4.5 MCG/ACT inhaler; Inhale 2 puffs 2 (Two) Times a Day for 30 days.  Discussed spirometry performed at visit today.  Discussed normal xray from 3/1/2018    Urinary tract infection without hematuria, site unspecified  Complete treatment and follow up if symptoms not resolved. Drink adequate water. Monitor for fever.  Essential hypertension  -     lisinopril (PRINIVIL,ZESTRIL) 5 MG tablet; Take 1 tablet by mouth Daily.  Tobacco abuse  Continue nicotine patches until 2 weeks of 7 mg completed, then treatment completed. Call with any issues.  Other orders  Discussed lab results from 3/1/2018          Return in about 1 month (around 4/8/2018), or if symptoms worsen or fail to improve.

## 2018-03-20 ENCOUNTER — TELEPHONE (OUTPATIENT)
Dept: INTERNAL MEDICINE | Facility: CLINIC | Age: 58
End: 2018-03-20

## 2018-03-20 DIAGNOSIS — F41.1 GENERALIZED ANXIETY DISORDER: ICD-10-CM

## 2018-03-20 DIAGNOSIS — J44.1 CHRONIC OBSTRUCTIVE PULMONARY DISEASE WITH ACUTE EXACERBATION (HCC): ICD-10-CM

## 2018-03-20 RX ORDER — AZITHROMYCIN 250 MG/1
TABLET, FILM COATED ORAL
Qty: 6 TABLET | Refills: 0 | Status: SHIPPED | OUTPATIENT
Start: 2018-03-20 | End: 2018-03-25

## 2018-03-20 RX ORDER — LISINOPRIL 5 MG/1
5 TABLET ORAL
Qty: 30 TABLET | Refills: 3 | Status: SHIPPED | OUTPATIENT
Start: 2018-03-20 | End: 2018-04-12 | Stop reason: SDUPTHER

## 2018-03-20 RX ORDER — BUDESONIDE AND FORMOTEROL FUMARATE DIHYDRATE 160; 4.5 UG/1; UG/1
2 AEROSOL RESPIRATORY (INHALATION)
Qty: 1 INHALER | Refills: 12 | Status: SHIPPED | OUTPATIENT
Start: 2018-03-20 | End: 2018-04-12 | Stop reason: SDUPTHER

## 2018-03-20 RX ORDER — FLUOXETINE HYDROCHLORIDE 20 MG/1
20 CAPSULE ORAL DAILY
Qty: 30 CAPSULE | Refills: 0 | Status: SHIPPED | OUTPATIENT
Start: 2018-03-20 | End: 2018-04-12 | Stop reason: SDUPTHER

## 2018-03-20 RX ORDER — ALBUTEROL SULFATE 90 UG/1
2 AEROSOL, METERED RESPIRATORY (INHALATION) EVERY 4 HOURS PRN
Qty: 1 INHALER | Refills: 3 | Status: SHIPPED | OUTPATIENT
Start: 2018-03-20 | End: 2018-04-12 | Stop reason: SDUPTHER

## 2018-03-20 RX ORDER — AZITHROMYCIN 250 MG/1
TABLET, FILM COATED ORAL
Qty: 6 TABLET | Refills: 0 | Status: SHIPPED | OUTPATIENT
Start: 2018-03-20 | End: 2018-03-20 | Stop reason: SDUPTHER

## 2018-03-20 NOTE — TELEPHONE ENCOUNTER
Please inquire what kind of sick and ask if patient has been in hospital since 2/17/2018 discharge. Patient was seen on 3/8/2018 in office and is scheduled to return on 4/9/2018. Please have her come in sooner if needed- I have openings on Thursday.

## 2018-03-20 NOTE — TELEPHONE ENCOUNTER
I have sent in antibiotic, but if no relief she needs to come to clinic before next appointment or go to ER due to past hospitalization./COPD.
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
## 2018-03-23 ENCOUNTER — TELEPHONE (OUTPATIENT)
Dept: INTERNAL MEDICINE | Facility: CLINIC | Age: 58
End: 2018-03-23
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
## 2018-04-12 ENCOUNTER — OFFICE VISIT (OUTPATIENT)
Dept: INTERNAL MEDICINE | Facility: CLINIC | Age: 58
End: 2018-04-12

## 2018-04-12 VITALS
SYSTOLIC BLOOD PRESSURE: 138 MMHG | BODY MASS INDEX: 28.88 KG/M2 | WEIGHT: 163 LBS | TEMPERATURE: 97.7 F | DIASTOLIC BLOOD PRESSURE: 72 MMHG | HEART RATE: 72 BPM | RESPIRATION RATE: 18 BRPM

## 2018-04-12 DIAGNOSIS — Z23 NEED FOR PNEUMOCOCCAL VACCINATION: ICD-10-CM

## 2018-04-12 DIAGNOSIS — R82.90 ABNORMAL URINE ODOR: ICD-10-CM

## 2018-04-12 DIAGNOSIS — F41.1 GENERALIZED ANXIETY DISORDER: ICD-10-CM

## 2018-04-12 DIAGNOSIS — R31.29 OTHER MICROSCOPIC HEMATURIA: Primary | ICD-10-CM

## 2018-04-12 DIAGNOSIS — Z23 NEED FOR TDAP VACCINATION: ICD-10-CM

## 2018-04-12 DIAGNOSIS — I10 ESSENTIAL HYPERTENSION: ICD-10-CM

## 2018-04-12 DIAGNOSIS — F41.9 ANXIETY: ICD-10-CM

## 2018-04-12 DIAGNOSIS — R31.9 HEMATURIA, UNSPECIFIED TYPE: ICD-10-CM

## 2018-04-12 DIAGNOSIS — Z12.11 ENCOUNTER FOR SCREENING COLONOSCOPY: ICD-10-CM

## 2018-04-12 DIAGNOSIS — Z12.31 ENCOUNTER FOR SCREENING MAMMOGRAM FOR BREAST CANCER: ICD-10-CM

## 2018-04-12 DIAGNOSIS — Z11.59 NEED FOR HEPATITIS C SCREENING TEST: ICD-10-CM

## 2018-04-12 DIAGNOSIS — J44.9 CHRONIC OBSTRUCTIVE PULMONARY DISEASE, UNSPECIFIED COPD TYPE (HCC): Primary | ICD-10-CM

## 2018-04-12 LAB
A/C: NORMAL
BACTERIA UR QL AUTO: ABNORMAL /HPF
CLARITY, POC: CLEAR
COLOR UR: YELLOW
EXPIRATION DATE: ABNORMAL
EXPIRATION DATE: NORMAL
GLUCOSE UR STRIP-MCNC: NEGATIVE MG/DL
HCV AB SER DONR QL: NORMAL
HYALINE CASTS UR QL AUTO: ABNORMAL /LPF
KETONES UR QL: NEGATIVE
LEUKOCYTE EST, POC: NEGATIVE
Lab: ABNORMAL
Lab: NORMAL
NITRITE UR-MCNC: NEGATIVE MG/ML
PH UR: 5 [PH] (ref 5–8)
POC CREATININE URINE: 300
POC MICROALBUMIN URINE: 80
PROT UR STRIP-MCNC: NEGATIVE MG/DL
PROT/CREAT UR: 100 MG/G CREA
RBC # UR STRIP: ABNORMAL /UL
RBC # UR: ABNORMAL /HPF
REF LAB TEST METHOD: ABNORMAL
SP GR UR: 1.02 (ref 1–1.03)
SQUAMOUS #/AREA URNS HPF: ABNORMAL /HPF
WBC UR QL AUTO: ABNORMAL /HPF

## 2018-04-12 PROCEDURE — 90715 TDAP VACCINE 7 YRS/> IM: CPT | Performed by: NURSE PRACTITIONER

## 2018-04-12 PROCEDURE — 81002 URINALYSIS NONAUTO W/O SCOPE: CPT | Performed by: NURSE PRACTITIONER

## 2018-04-12 PROCEDURE — 99214 OFFICE O/P EST MOD 30 MIN: CPT | Performed by: NURSE PRACTITIONER

## 2018-04-12 PROCEDURE — 90471 IMMUNIZATION ADMIN: CPT | Performed by: NURSE PRACTITIONER

## 2018-04-12 PROCEDURE — 86803 HEPATITIS C AB TEST: CPT | Performed by: NURSE PRACTITIONER

## 2018-04-12 PROCEDURE — 90732 PPSV23 VACC 2 YRS+ SUBQ/IM: CPT | Performed by: NURSE PRACTITIONER

## 2018-04-12 PROCEDURE — 81015 MICROSCOPIC EXAM OF URINE: CPT | Performed by: NURSE PRACTITIONER

## 2018-04-12 PROCEDURE — 82044 UR ALBUMIN SEMIQUANTITATIVE: CPT | Performed by: NURSE PRACTITIONER

## 2018-04-12 PROCEDURE — 90472 IMMUNIZATION ADMIN EACH ADD: CPT | Performed by: NURSE PRACTITIONER

## 2018-04-12 RX ORDER — BUDESONIDE AND FORMOTEROL FUMARATE DIHYDRATE 160; 4.5 UG/1; UG/1
2 AEROSOL RESPIRATORY (INHALATION)
Qty: 1 INHALER | Refills: 12 | Status: SHIPPED | OUTPATIENT
Start: 2018-04-12 | End: 2018-05-12

## 2018-04-12 RX ORDER — FLUOXETINE HYDROCHLORIDE 20 MG/1
20 CAPSULE ORAL DAILY
Qty: 30 CAPSULE | Refills: 0 | Status: SHIPPED | OUTPATIENT
Start: 2018-04-12 | End: 2018-05-30 | Stop reason: SDUPTHER

## 2018-04-12 RX ORDER — LISINOPRIL 5 MG/1
5 TABLET ORAL
Qty: 30 TABLET | Refills: 3 | Status: SHIPPED | OUTPATIENT
Start: 2018-04-12 | End: 2018-12-12

## 2018-04-12 RX ORDER — ALBUTEROL SULFATE 90 UG/1
2 AEROSOL, METERED RESPIRATORY (INHALATION) EVERY 4 HOURS PRN
Qty: 1 INHALER | Refills: 3 | Status: SHIPPED | OUTPATIENT
Start: 2018-04-12 | End: 2018-12-12
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
## 2018-05-30 DIAGNOSIS — F41.1 GENERALIZED ANXIETY DISORDER: ICD-10-CM

## 2018-05-30 DIAGNOSIS — F41.9 ANXIETY: ICD-10-CM

## 2018-05-30 RX ORDER — FLUOXETINE HYDROCHLORIDE 20 MG/1
CAPSULE ORAL
Qty: 30 CAPSULE | Refills: 0 | Status: SHIPPED | OUTPATIENT
Start: 2018-05-30 | End: 2018-07-25 | Stop reason: SDUPTHER

## 2018-07-25 DIAGNOSIS — F41.9 ANXIETY: ICD-10-CM

## 2018-07-25 DIAGNOSIS — F41.1 GENERALIZED ANXIETY DISORDER: ICD-10-CM

## 2018-07-25 RX ORDER — FLUOXETINE HYDROCHLORIDE 20 MG/1
CAPSULE ORAL
Qty: 30 CAPSULE | Refills: 0 | Status: SHIPPED | OUTPATIENT
Start: 2018-07-25 | End: 2018-09-19 | Stop reason: SDUPTHER

## 2018-09-19 DIAGNOSIS — F41.9 ANXIETY: ICD-10-CM

## 2018-09-19 DIAGNOSIS — F41.1 GENERALIZED ANXIETY DISORDER: ICD-10-CM

## 2018-09-19 RX ORDER — FLUOXETINE HYDROCHLORIDE 20 MG/1
CAPSULE ORAL
Qty: 30 CAPSULE | Refills: 0 | Status: SHIPPED | OUTPATIENT
Start: 2018-09-19 | End: 2018-12-12

## 2018-12-12 ENCOUNTER — OFFICE VISIT (OUTPATIENT)
Dept: RETAIL CLINIC | Facility: CLINIC | Age: 58
End: 2018-12-12

## 2018-12-12 VITALS
RESPIRATION RATE: 16 BRPM | HEART RATE: 68 BPM | HEIGHT: 63 IN | DIASTOLIC BLOOD PRESSURE: 86 MMHG | TEMPERATURE: 98.2 F | SYSTOLIC BLOOD PRESSURE: 140 MMHG | WEIGHT: 164 LBS | BODY MASS INDEX: 29.06 KG/M2 | OXYGEN SATURATION: 97 %

## 2018-12-12 DIAGNOSIS — J06.9 UPPER RESPIRATORY TRACT INFECTION, UNSPECIFIED TYPE: ICD-10-CM

## 2018-12-12 DIAGNOSIS — J44.1 COPD WITH ACUTE EXACERBATION (HCC): ICD-10-CM

## 2018-12-12 DIAGNOSIS — J40 BRONCHITIS: Primary | ICD-10-CM

## 2018-12-12 PROCEDURE — 99214 OFFICE O/P EST MOD 30 MIN: CPT | Performed by: NURSE PRACTITIONER

## 2018-12-12 RX ORDER — PREDNISONE 20 MG/1
20 TABLET ORAL DAILY
Qty: 10 TABLET | Refills: 0 | Status: SHIPPED | OUTPATIENT
Start: 2018-12-12 | End: 2018-12-22

## 2018-12-12 RX ORDER — DEXTROMETHORPHAN HYDROBROMIDE AND PROMETHAZINE HYDROCHLORIDE 15; 6.25 MG/5ML; MG/5ML
2.5 SYRUP ORAL 4 TIMES DAILY PRN
Qty: 118 ML | Refills: 0 | Status: SHIPPED | OUTPATIENT
Start: 2018-12-12 | End: 2018-12-22

## 2018-12-12 RX ORDER — ALBUTEROL SULFATE 90 UG/1
2 AEROSOL, METERED RESPIRATORY (INHALATION) EVERY 4 HOURS PRN
Qty: 1 INHALER | Refills: 0 | Status: SHIPPED | OUTPATIENT
Start: 2018-12-12 | End: 2019-01-11

## 2018-12-12 RX ORDER — AZITHROMYCIN 250 MG/1
TABLET, FILM COATED ORAL
Qty: 6 TABLET | Refills: 0 | Status: SHIPPED | OUTPATIENT
Start: 2018-12-12 | End: 2019-06-26

## 2018-12-12 RX ORDER — GUAIFENESIN 600 MG/1
1200 TABLET, EXTENDED RELEASE ORAL 2 TIMES DAILY PRN
Qty: 40 TABLET | Refills: 0 | Status: SHIPPED | OUTPATIENT
Start: 2018-12-12 | End: 2018-12-22

## 2018-12-12 NOTE — PATIENT INSTRUCTIONS
Chronic Obstructive Pulmonary Disease Exacerbation  Chronic obstructive pulmonary disease (COPD) is a common lung problem. In COPD, the flow of air from the lungs is limited. COPD exacerbations are times that breathing gets worse and you need extra treatment. Without treatment they can be life threatening. If they happen often, your lungs can become more damaged. If your COPD gets worse, your doctor may treat you with:  · Medicines.  · Oxygen.  · Different ways to clear your airway, such as using a mask.    Follow these instructions at home:  · Do not smoke.  · Avoid tobacco smoke and other things that bother your lungs.  · If given, take your antibiotic medicine as told. Finish the medicine even if you start to feel better.  · Only take medicines as told by your doctor.  · Drink enough fluids to keep your pee (urine) clear or pale yellow (unless your doctor has told you not to).  · Use a cool mist machine (vaporizer).  · If you use oxygen or a machine that turns liquid medicine into a mist (nebulizer), continue to use them as told.  · Keep up with shots (vaccinations) as told by your doctor.  · Exercise regularly.  · Eat healthy foods.  · Keep all doctor visits as told.  Get help right away if:  · You are very short of breath and it gets worse.  · You have trouble talking.  · You have bad chest pain.  · You have blood in your spit (sputum).  · You have a fever.  · You keep throwing up (vomiting).  · You feel weak, or you pass out (faint).  · You feel confused.  · You keep getting worse.  This information is not intended to replace advice given to you by your health care provider. Make sure you discuss any questions you have with your health care provider.  Document Released: 12/06/2012 Document Revised: 05/25/2017 Document Reviewed: 08/22/2014  Elsevier Interactive Patient Education © 2017 [employer] Inc[employer]

## 2018-12-12 NOTE — PROGRESS NOTES
TALITA@  Micaela Reyes is a 58 y.o. female.   Chief Complaint   Patient presents with   • URI     productive cough, congestion, sore throat,runny nose for 4 days      Cough   This is a new problem. The current episode started in the past 7 days. The problem has been gradually worsening. The problem occurs every few minutes. The cough is productive of sputum. Associated symptoms include headaches, nasal congestion, rhinorrhea, a sore throat, shortness of breath and wheezing. Pertinent negatives include no chest pain, chills, ear congestion, ear pain, eye redness, fever, heartburn, hemoptysis, myalgias, postnasal drip, rash, sweats or weight loss. Nothing aggravates the symptoms. Treatments tried: dayquil/nyquil. The treatment provided mild relief.        The following portions of the patient's history were reviewed and updated as appropriate: allergies, current medications, past family history, past medical history, past social history, past surgical history and problem list.    Current Outpatient Medications:   •  albuterol 108 (90 Base) MCG/ACT inhaler, Inhale 2 puffs Every 4 (Four) Hours As Needed for Wheezing or Shortness of Air for up to 30 days., Disp: 1 inhaler, Rfl: 0  •  azithromycin (ZITHROMAX Z-DREW) 250 MG tablet, Take 2 tablets the first day, then 1 tablet daily for 4 days., Disp: 6 tablet, Rfl: 0  •  guaiFENesin (MUCINEX) 600 MG 12 hr tablet, Take 2 tablets by mouth 2 (Two) Times a Day As Needed for Cough or Congestion for up to 10 days., Disp: 40 tablet, Rfl: 0  •  predniSONE (DELTASONE) 20 MG tablet, Take 1 tablet by mouth Daily for 10 days., Disp: 10 tablet, Rfl: 0  •  promethazine-dextromethorphan (PROMETHAZINE-DM) 6.25-15 MG/5ML syrup, Take 2.5 mL by mouth 4 (Four) Times a Day As Needed for Cough for up to 10 days. May increase to 5 mL if ineffective, Disp: 118 mL, Rfl: 0    Allergies   Allergen Reactions   • Sulfa Antibiotics Itching       Review of Systems   Constitutional: Positive for  "fatigue. Negative for appetite change, chills, fever and weight loss.   HENT: Positive for congestion, rhinorrhea, sinus pressure and sore throat. Negative for ear pain, postnasal drip, sneezing and tinnitus.    Eyes: Negative for redness and itching.   Respiratory: Positive for cough, shortness of breath and wheezing. Negative for hemoptysis.    Cardiovascular: Negative for chest pain.   Gastrointestinal: Negative for diarrhea, heartburn, nausea and vomiting.   Musculoskeletal: Negative for myalgias.   Skin: Negative for rash.   Neurological: Positive for headaches. Negative for dizziness.       Objective     Visit Vitals  /86 (BP Location: Left arm, Patient Position: Sitting, Cuff Size: Adult)   Pulse 68   Temp 98.2 °F (36.8 °C) (Oral)   Resp 16   Ht 160 cm (63\")   Wt 74.4 kg (164 lb)   SpO2 97%   BMI 29.05 kg/m²         Physical Exam   Constitutional: She is oriented to person, place, and time. She appears well-developed and well-nourished. She appears ill. No distress.   HENT:   Head: Normocephalic.   Right Ear: Tympanic membrane, external ear and ear canal normal.   Left Ear: Tympanic membrane, external ear and ear canal normal.   Nose: Mucosal edema present. No sinus tenderness.   Mouth/Throat: Uvula is midline. Mucous membranes are pale. Posterior oropharyngeal erythema present.   Eyes: Conjunctivae are normal.   Cardiovascular: Normal rate, regular rhythm and normal heart sounds.   Pulmonary/Chest: Effort normal. She has decreased breath sounds in the right lower field and the left lower field. She has wheezes (expiratory, scattered). She has rhonchi. She has no rales.   Lymphadenopathy:     She has no cervical adenopathy.   Neurological: She is alert and oriented to person, place, and time.   Skin: Skin is warm and dry. Capillary refill takes less than 2 seconds.   Normal turgor       Lab Results (last 24 hours)     ** No results found for the last 24 hours. **          Assessment/Plan   Micaela was seen " today for uri.    Diagnoses and all orders for this visit:    Bronchitis  -     albuterol 108 (90 Base) MCG/ACT inhaler; Inhale 2 puffs Every 4 (Four) Hours As Needed for Wheezing or Shortness of Air for up to 30 days.  -     guaiFENesin (MUCINEX) 600 MG 12 hr tablet; Take 2 tablets by mouth 2 (Two) Times a Day As Needed for Cough or Congestion for up to 10 days.  -     promethazine-dextromethorphan (PROMETHAZINE-DM) 6.25-15 MG/5ML syrup; Take 2.5 mL by mouth 4 (Four) Times a Day As Needed for Cough for up to 10 days. May increase to 5 mL if ineffective. Advised may cause drowsiness; not to be taken with cold/cough/sinus remedies, alcohol or sedatives.    Upper respiratory tract infection, unspecified type  -     guaiFENesin (MUCINEX) 600 MG 12 hr tablet; Take 2 tablets by mouth 2 (Two) Times a Day As Needed for Cough or Congestion for up to 10 days.  -     promethazine-dextromethorphan (PROMETHAZINE-DM) 6.25-15 MG/5ML syrup; Take 2.5 mL by mouth 4 (Four) Times a Day As Needed for Cough for up to 10 days. May increase to 5 mL if ineffective    COPD with acute exacerbation (CMS/[facility])  -     azithromycin (ZITHROMAX Z-DREW) 250 MG tablet; Take 2 tablets the first day, then 1 tablet daily for 4 days.  -     predniSONE (DELTASONE) 20 MG tablet; Take 1 tablet by mouth Daily for 10 days.  -     albuterol 108 (90 Base) MCG/ACT inhaler; Inhale 2 puffs Every 4 (Four) Hours As Needed for Wheezing or Shortness of Air for up to 30 days.  - Refused steroid inhalers of any kind at this time; daughter states they make her mean  - Strongly encouraged to make appointment with pulmonologist for follow up.    An After Visit Summary was reviewed, printed and given to the patient.  Understanding verbalized and patient agreed with treatment plan.  If symptom(s) worsen or fail to improve, return to clinic, follow up with PCP or go to [facility].

## 2019-06-26 ENCOUNTER — OFFICE VISIT (OUTPATIENT)
Dept: RETAIL CLINIC | Facility: CLINIC | Age: 59
End: 2019-06-26

## 2019-06-26 VITALS
SYSTOLIC BLOOD PRESSURE: 132 MMHG | BODY MASS INDEX: 28.63 KG/M2 | HEIGHT: 63 IN | DIASTOLIC BLOOD PRESSURE: 78 MMHG | WEIGHT: 161.6 LBS | OXYGEN SATURATION: 98 % | RESPIRATION RATE: 18 BRPM | TEMPERATURE: 97.4 F | HEART RATE: 97 BPM

## 2019-06-26 DIAGNOSIS — R10.9 FLANK PAIN: ICD-10-CM

## 2019-06-26 DIAGNOSIS — R30.0 DYSURIA: Primary | ICD-10-CM

## 2019-06-26 LAB
BILIRUB BLD-MCNC: NEGATIVE MG/DL
CLARITY, POC: CLEAR
COLOR UR: YELLOW
GLUCOSE UR STRIP-MCNC: NEGATIVE MG/DL
KETONES UR QL: NEGATIVE
LEUKOCYTE EST, POC: NEGATIVE
NITRITE UR-MCNC: NEGATIVE MG/ML
PH UR: 5 [PH] (ref 5–8)
PROT UR STRIP-MCNC: NEGATIVE MG/DL
RBC # UR STRIP: ABNORMAL /UL
SP GR UR: 1.02 (ref 1–1.03)
UROBILINOGEN UR QL: NORMAL

## 2019-06-26 PROCEDURE — 81003 URINALYSIS AUTO W/O SCOPE: CPT | Performed by: NURSE PRACTITIONER

## 2019-06-26 PROCEDURE — 99213 OFFICE O/P EST LOW 20 MIN: CPT | Performed by: NURSE PRACTITIONER

## 2019-06-26 RX ORDER — IBUPROFEN 800 MG/1
800 TABLET ORAL EVERY 8 HOURS PRN
Qty: 12 TABLET | Refills: 0 | Status: SHIPPED | OUTPATIENT
Start: 2019-06-26 | End: 2019-06-30

## 2019-06-26 NOTE — PATIENT INSTRUCTIONS
Flank Pain, Adult  Flank pain is pain in your side. The flank is the area of your side between your upper belly (abdomen) and your back. The pain may occur over a short time (acute), or it may be long-term or come back often (chronic). It may be mild or very bad. Pain in this area can be caused by many different things.  Follow these instructions at home:  · Drink enough fluid to keep your pee (urine) clear or pale yellow.  · Rest as told by your doctor.  · Take over-the-counter and prescription medicines only as told by your doctor.  · Keep a journal to keep track of:  ? What has caused your flank pain.  ? What has made it feel better.  · Keep all follow-up visits as told by your doctor. This is important.  Contact a doctor if:  · Medicine does not help your pain.  · You have new symptoms.  · Your pain gets worse.  · You have a fever.  · Your symptoms last longer than 2-3 days.  · You have trouble peeing.  · You are peeing more often than normal.  Get help right away if:  · You have trouble breathing.  · You are short of breath.  · Your belly hurts, or it is swollen or red.  · You feel sick to your stomach (nauseous).  · You throw up (vomit).  · You feel like you will pass out, or you do pass out (faint).  · You have blood in your pee.  Summary  · Flank pain is pain in your side. The flank is the area of your side between your upper belly (abdomen) and your back.  · Flank pain may occur over a short time (acute), or it may be long-term or come back often (chronic). It may be mild or very bad.  · Pain in this area can be caused by many different things.  · Contact your doctor if your symptoms get worse or they last longer than 2-3 days.  This information is not intended to replace advice given to you by your health care provider. Make sure you discuss any questions you have with your health care provider.  Document Released: 09/26/2009 Document Revised: 04/09/2018 Document Reviewed: 04/09/2018  [employer]  Patient Education © 2019 Elsevier Inc.

## 2019-06-26 NOTE — PROGRESS NOTES
Urinary Tract Infection      Subjective   Micaela Reyes is a 58 y.o. female.     Urinary Tract Infection    This is a new problem. Episode onset: 2 weeks ago. The problem occurs every urination. The problem has been gradually worsening. The quality of the pain is described as aching. The pain is at a severity of 9/10. The pain is severe. There has been no fever. She is not sexually active. There is no history of pyelonephritis. Associated symptoms include flank pain, frequency and urgency. Pertinent negatives include no chills, discharge, hematuria, hesitancy, nausea, possible pregnancy, sweats or vomiting. She has tried nothing for the symptoms. There is no history of catheterization, kidney stones, recurrent UTIs, a single kidney, urinary stasis or a urological procedure.        Patient Active Problem List   Diagnosis   • Chronic obstructive pulmonary disease with acute exacerbation (CMS/HCC)   • Community acquired pneumonia of right lower lobe of lung (CMS/HCC)   • Generalized anxiety disorder   • Essential hypertension   • Tobacco abuse       Allergies   Allergen Reactions   • Sulfa Antibiotics Itching        Current Outpatient Medications on File Prior to Visit   Medication Sig Dispense Refill   • [DISCONTINUED] azithromycin (ZITHROMAX Z-DREW) 250 MG tablet Take 2 tablets the first day, then 1 tablet daily for 4 days. 6 tablet 0     No current facility-administered medications on file prior to visit.        Past Medical History:   Diagnosis Date   • Anxiety    • COPD (chronic obstructive pulmonary disease) (CMS/HCC)    • Depression    • Hypertension    • Urinary tract infection        Past Surgical History:   Procedure Laterality Date   • TUBAL ABDOMINAL LIGATION         Family History   Problem Relation Age of Onset   • Cancer Mother    • Cancer Father    • Cancer Maternal Aunt    • Cancer Maternal Uncle    • Cancer Maternal Grandmother    • Cancer Maternal Grandfather    • Cancer Paternal Grandmother    •  Cancer Paternal Grandfather        Social History     Socioeconomic History   • Marital status: Single     Spouse name: Not on file   • Number of children: Not on file   • Years of education: Not on file   • Highest education level: Not on file   Tobacco Use   • Smoking status: Current Some Day Smoker     Packs/day: 1.00     Types: Cigarettes   • Smokeless tobacco: Never Used   Substance and Sexual Activity   • Alcohol use: Yes     Frequency: 2-4 times a month     Comment: ocassionally   • Drug use: No   • Sexual activity: Defer     Birth control/protection: Post-menopausal       Travel:  No recent travel within the last 21 days outside the [place] Denies recent travel to one of the following [place]ries:  [place], [place], [place], or [place].  Denies contact with anyone who has traveled to one of the following [place]  Countries: [place], [place], [place], or [place] within the last 21 days and is known or suspected to have Ebola.  Denies having had any contact with the human remains, blood or any bodily fluids of someone who is known or suspected to have Ebola within the last 21 days.     OB History     No data available          Review of Systems   Constitutional: Negative for chills, fatigue and fever.   Respiratory: Negative for shortness of breath.    Cardiovascular: Negative for chest pain.   Gastrointestinal: Positive for abdominal pain. Negative for constipation, diarrhea, nausea and vomiting.   Genitourinary: Positive for flank pain, frequency and urgency. Negative for difficulty urinating, dysuria, hematuria, hesitancy, pelvic pain, vaginal bleeding, vaginal discharge and vaginal pain.   Musculoskeletal: Positive for back pain. Negative for myalgias and neck pain.   Skin: Negative for rash.   All other systems reviewed and are negative.      /78 (BP Location: Left arm, Patient Position: Sitting, Cuff Size: Adult)   Pulse 97   Temp 97.4 °F (36.3 °C) (Oral)   Resp 18   Ht 160 cm  "(63\")   Wt 73.3 kg (161 lb 9.6 oz)   SpO2 98%   BMI 28.63 kg/m²     Objective   Physical Exam   Constitutional: She is oriented to person, place, and time. She appears well-developed and well-nourished. No distress.   HENT:   Head: Normocephalic and atraumatic.   Right Ear: External ear normal.   Left Ear: External ear normal.   Nose: Nose normal.   Mouth/Throat: Oropharynx is clear and moist.   Eyes: Conjunctivae and EOM are normal. Pupils are equal, round, and reactive to light. Right eye exhibits no discharge. Left eye exhibits no discharge. No scleral icterus.   Neck: Normal range of motion. Neck supple.   Cardiovascular: Normal rate, regular rhythm and normal heart sounds.   Pulmonary/Chest: Effort normal and breath sounds normal.   Abdominal: Soft. Bowel sounds are normal. She exhibits no distension and no mass. There is no tenderness. There is no guarding.   Musculoskeletal:        Arms:  Lymphadenopathy:     She has no cervical adenopathy.   Neurological: She is alert and oriented to person, place, and time.   Skin: Skin is warm and dry. No rash noted. She is not diaphoretic.   Psychiatric: She has a normal mood and affect. Her behavior is normal.       Assessment/Plan   Micaela was seen today for urinary tract infection.    Diagnoses and all orders for this visit:    Dysuria  -     POCT urinalysis dipstick, automated  -     Urine Culture - Urine, Urine, Clean Catch    Flank pain  -     ibuprofen (ADVIL,MOTRIN) 800 MG tablet; Take 1 tablet by mouth Every 8 (Eight) Hours As Needed for Moderate Pain  for up to 4 days.    Plan of care discussed with patient: increase PO fluids; take ibuprofen as instructed; rest; patient reports she does not have a PCP- gave list of local PCPs at discharge and instructed patient to call to establish care with local provider; will send urine for culture and call in 2-3 days with results. Discussed with patient that she may need further investigation of complaint if urine culture " is negative and thus the importance of establishing care with a primary provider. Patient verbalized understanding.     Results for orders placed or performed in visit on 06/26/19   POCT urinalysis dipstick, automated   Result Value Ref Range    Color Yellow Yellow, Straw, Dark Yellow, Leigh    Clarity, UA Clear Clear    Specific Gravity  1.020 1.005 - 1.030    pH, Urine 5.0 5.0 - 8.0    Leukocytes Negative Negative    Nitrite, UA Negative Negative    Protein, POC Negative Negative mg/dL    Glucose, UA Negative Negative, 1000 mg/dL (3+) mg/dL    Ketones, UA Negative Negative    Urobilinogen, UA Normal Normal    Bilirubin Negative Negative    Blood, UA Small (A) Negative       No Follow-up on file.

## 2019-06-29 ENCOUNTER — HOSPITAL ENCOUNTER (EMERGENCY)
Facility: HOSPITAL | Age: 59
Discharge: HOME | End: 2019-06-29
Attending: EMERGENCY MEDICINE | Admitting: EMERGENCY MEDICINE

## 2019-06-29 ENCOUNTER — APPOINTMENT (OUTPATIENT)
Dept: CT IMAGING | Facility: HOSPITAL | Age: 59
End: 2019-06-29

## 2019-06-29 VITALS
BODY MASS INDEX: 28.35 KG/M2 | DIASTOLIC BLOOD PRESSURE: 123 MMHG | TEMPERATURE: 97.5 F | HEART RATE: 86 BPM | OXYGEN SATURATION: 96 % | HEIGHT: 63 IN | WEIGHT: 160 LBS | RESPIRATION RATE: 18 BRPM | SYSTOLIC BLOOD PRESSURE: 133 MMHG

## 2019-06-29 DIAGNOSIS — M54.50 ACUTE LEFT-SIDED LOW BACK PAIN WITHOUT SCIATICA: Primary | ICD-10-CM

## 2019-06-29 LAB
ALBUMIN SERPL-MCNC: 4.6 G/DL (ref 3.5–5.2)
ALBUMIN/GLOB SERPL: 1.6 G/DL
ALP SERPL-CCNC: 83 U/L (ref 39–117)
ALT SERPL W P-5'-P-CCNC: 35 U/L (ref 1–33)
ANION GAP SERPL CALCULATED.3IONS-SCNC: 15 MMOL/L (ref 5–15)
AST SERPL-CCNC: 22 U/L (ref 1–32)
BACTERIA UR QL AUTO: ABNORMAL /HPF
BASOPHILS # BLD AUTO: 0.04 10*3/MM3 (ref 0–0.2)
BASOPHILS NFR BLD AUTO: 0.5 % (ref 0–1.5)
BILIRUB SERPL-MCNC: <0.2 MG/DL (ref 0.2–1.2)
BILIRUB UR QL STRIP: NEGATIVE
BUN BLD-MCNC: 17 MG/DL (ref 6–20)
BUN/CREAT SERPL: 26.2 (ref 7–25)
CALCIUM SPEC-SCNC: 10 MG/DL (ref 8.6–10.5)
CHLORIDE SERPL-SCNC: 100 MMOL/L (ref 98–107)
CLARITY UR: CLEAR
CO2 SERPL-SCNC: 23 MMOL/L (ref 22–29)
COLOR UR: YELLOW
CREAT BLD-MCNC: 0.65 MG/DL (ref 0.57–1)
DEPRECATED RDW RBC AUTO: 44.6 FL (ref 37–54)
EOSINOPHIL # BLD AUTO: 0.16 10*3/MM3 (ref 0–0.4)
EOSINOPHIL NFR BLD AUTO: 2.2 % (ref 0.3–6.2)
ERYTHROCYTE [DISTWIDTH] IN BLOOD BY AUTOMATED COUNT: 12.6 % (ref 12.3–15.4)
GFR SERPL CREATININE-BSD FRML MDRD: 94 ML/MIN/1.73
GLOBULIN UR ELPH-MCNC: 2.8 GM/DL
GLUCOSE BLD-MCNC: 92 MG/DL (ref 65–99)
GLUCOSE UR STRIP-MCNC: NEGATIVE MG/DL
HCT VFR BLD AUTO: 40.9 % (ref 34–46.6)
HGB BLD-MCNC: 13.5 G/DL (ref 12–15.9)
HGB UR QL STRIP.AUTO: ABNORMAL
HYALINE CASTS UR QL AUTO: ABNORMAL /LPF
IMM GRANULOCYTES # BLD AUTO: 0.09 10*3/MM3 (ref 0–0.05)
IMM GRANULOCYTES NFR BLD AUTO: 1.2 % (ref 0–0.5)
KETONES UR QL STRIP: NEGATIVE
LEUKOCYTE ESTERASE UR QL STRIP.AUTO: NEGATIVE
LIPASE SERPL-CCNC: 29 U/L (ref 13–60)
LYMPHOCYTES # BLD AUTO: 1.67 10*3/MM3 (ref 0.7–3.1)
LYMPHOCYTES NFR BLD AUTO: 22.7 % (ref 19.6–45.3)
MCH RBC QN AUTO: 31.8 PG (ref 26.6–33)
MCHC RBC AUTO-ENTMCNC: 33 G/DL (ref 31.5–35.7)
MCV RBC AUTO: 96.2 FL (ref 79–97)
MONOCYTES # BLD AUTO: 0.65 10*3/MM3 (ref 0.1–0.9)
MONOCYTES NFR BLD AUTO: 8.8 % (ref 5–12)
NEUTROPHILS # BLD AUTO: 4.75 10*3/MM3 (ref 1.7–7)
NEUTROPHILS NFR BLD AUTO: 64.6 % (ref 42.7–76)
NITRITE UR QL STRIP: POSITIVE
NRBC BLD AUTO-RTO: 0 /100 WBC (ref 0–0.2)
PH UR STRIP.AUTO: <=5 [PH] (ref 5–8)
PLATELET # BLD AUTO: 167 10*3/MM3 (ref 140–450)
PMV BLD AUTO: 10.8 FL (ref 6–12)
POTASSIUM BLD-SCNC: 4.4 MMOL/L (ref 3.5–5.2)
PROT SERPL-MCNC: 7.4 G/DL (ref 6–8.5)
PROT UR QL STRIP: NEGATIVE
RBC # BLD AUTO: 4.25 10*6/MM3 (ref 3.77–5.28)
RBC # UR: ABNORMAL /HPF
REF LAB TEST METHOD: ABNORMAL
SODIUM BLD-SCNC: 138 MMOL/L (ref 136–145)
SP GR UR STRIP: 1.02 (ref 1–1.03)
SQUAMOUS #/AREA URNS HPF: ABNORMAL /HPF
UROBILINOGEN UR QL STRIP: ABNORMAL
WBC NRBC COR # BLD: 7.36 10*3/MM3 (ref 3.4–10.8)
WBC UR QL AUTO: ABNORMAL /HPF

## 2019-06-29 PROCEDURE — 85025 COMPLETE CBC W/AUTO DIFF WBC: CPT | Performed by: EMERGENCY MEDICINE

## 2019-06-29 PROCEDURE — 96375 TX/PRO/DX INJ NEW DRUG ADDON: CPT

## 2019-06-29 PROCEDURE — 25010000002 HYDROMORPHONE PER 4 MG: Performed by: EMERGENCY MEDICINE

## 2019-06-29 PROCEDURE — 83690 ASSAY OF LIPASE: CPT | Performed by: EMERGENCY MEDICINE

## 2019-06-29 PROCEDURE — 25010000002 KETOROLAC TROMETHAMINE PER 15 MG: Performed by: EMERGENCY MEDICINE

## 2019-06-29 PROCEDURE — 80053 COMPREHEN METABOLIC PANEL: CPT | Performed by: EMERGENCY MEDICINE

## 2019-06-29 PROCEDURE — 81001 URINALYSIS AUTO W/SCOPE: CPT | Performed by: EMERGENCY MEDICINE

## 2019-06-29 PROCEDURE — 74176 CT ABD & PELVIS W/O CONTRAST: CPT

## 2019-06-29 PROCEDURE — 99283 EMERGENCY DEPT VISIT LOW MDM: CPT

## 2019-06-29 PROCEDURE — 25010000002 ONDANSETRON PER 1 MG: Performed by: EMERGENCY MEDICINE

## 2019-06-29 PROCEDURE — 96374 THER/PROPH/DIAG INJ IV PUSH: CPT

## 2019-06-29 RX ORDER — KETOROLAC TROMETHAMINE 15 MG/ML
15 INJECTION, SOLUTION INTRAMUSCULAR; INTRAVENOUS ONCE
Status: COMPLETED | OUTPATIENT
Start: 2019-06-29 | End: 2019-06-29

## 2019-06-29 RX ORDER — CYCLOBENZAPRINE HCL 5 MG
5 TABLET ORAL 3 TIMES DAILY PRN
Qty: 20 TABLET | Refills: 0 | Status: SHIPPED | OUTPATIENT
Start: 2019-06-29

## 2019-06-29 RX ORDER — ONDANSETRON 2 MG/ML
4 INJECTION INTRAMUSCULAR; INTRAVENOUS ONCE
Status: COMPLETED | OUTPATIENT
Start: 2019-06-29 | End: 2019-06-29

## 2019-06-29 RX ORDER — HYDROMORPHONE HYDROCHLORIDE 1 MG/ML
0.5 INJECTION, SOLUTION INTRAMUSCULAR; INTRAVENOUS; SUBCUTANEOUS ONCE
Status: COMPLETED | OUTPATIENT
Start: 2019-06-29 | End: 2019-06-29

## 2019-06-29 RX ORDER — NAPROXEN 500 MG/1
500 TABLET ORAL 2 TIMES DAILY PRN
Qty: 20 TABLET | Refills: 0 | Status: SHIPPED | OUTPATIENT
Start: 2019-06-29

## 2019-06-29 RX ADMIN — ONDANSETRON 4 MG: 2 INJECTION INTRAMUSCULAR; INTRAVENOUS at 05:01

## 2019-06-29 RX ADMIN — KETOROLAC TROMETHAMINE 15 MG: 15 INJECTION, SOLUTION INTRAMUSCULAR; INTRAVENOUS at 07:20

## 2019-06-29 RX ADMIN — SODIUM CHLORIDE 1000 ML: 9 INJECTION, SOLUTION INTRAVENOUS at 05:01

## 2019-06-29 RX ADMIN — HYDROMORPHONE HYDROCHLORIDE 0.5 MG: 1 INJECTION, SOLUTION INTRAMUSCULAR; INTRAVENOUS; SUBCUTANEOUS at 05:01

## 2019-07-01 ENCOUNTER — TELEPHONE (OUTPATIENT)
Dept: RETAIL CLINIC | Facility: CLINIC | Age: 59
End: 2019-07-01

## 2019-07-01 LAB
BACTERIA UR CULT: ABNORMAL
BACTERIA UR CULT: ABNORMAL
OTHER ANTIBIOTIC SUSC ISLT: ABNORMAL

## 2019-07-04 ENCOUNTER — TELEPHONE (OUTPATIENT)
Dept: RETAIL CLINIC | Facility: CLINIC | Age: 59
End: 2019-07-04

## 2019-07-04 NOTE — TELEPHONE ENCOUNTER
macrobid 100 mg. Po bid for 7 days. #14 no RF.    Diflucan 150 mg. Po after antibiotic prn yeast. # 1 no RF.    Both medications called into [facility] [place].     JULIANNE Dalton

## 2019-10-29 ENCOUNTER — OFFICE VISIT (OUTPATIENT)
Dept: RETAIL CLINIC | Facility: CLINIC | Age: 59
End: 2019-10-29

## 2019-10-29 VITALS
HEART RATE: 85 BPM | BODY MASS INDEX: 30.12 KG/M2 | DIASTOLIC BLOOD PRESSURE: 80 MMHG | RESPIRATION RATE: 20 BRPM | WEIGHT: 170 LBS | HEIGHT: 63 IN | OXYGEN SATURATION: 98 % | SYSTOLIC BLOOD PRESSURE: 130 MMHG | TEMPERATURE: 97.7 F

## 2019-10-29 DIAGNOSIS — J18.0 BRONCHOPNEUMONIA: Primary | ICD-10-CM

## 2019-10-29 PROCEDURE — 99213 OFFICE O/P EST LOW 20 MIN: CPT | Performed by: NURSE PRACTITIONER

## 2019-10-29 PROCEDURE — 96372 THER/PROPH/DIAG INJ SC/IM: CPT | Performed by: NURSE PRACTITIONER

## 2019-10-29 RX ORDER — DEXAMETHASONE SODIUM PHOSPHATE 10 MG/ML
10 INJECTION INTRAMUSCULAR; INTRAVENOUS ONCE
Status: COMPLETED | OUTPATIENT
Start: 2019-10-29 | End: 2019-10-29

## 2019-10-29 RX ORDER — AZITHROMYCIN 250 MG/1
TABLET, FILM COATED ORAL
Qty: 6 TABLET | Refills: 0 | Status: SHIPPED | OUTPATIENT
Start: 2019-10-29

## 2019-10-29 RX ORDER — GUAIFENESIN AND DEXTROMETHORPHAN HYDROBROMIDE 600; 30 MG/1; MG/1
1 TABLET, EXTENDED RELEASE ORAL EVERY 12 HOURS SCHEDULED
Qty: 20 TABLET | Refills: 0 | Status: SHIPPED | OUTPATIENT
Start: 2019-10-29

## 2019-10-29 RX ORDER — ALBUTEROL SULFATE 90 UG/1
2 AEROSOL, METERED RESPIRATORY (INHALATION) EVERY 4 HOURS PRN
Qty: 1 INHALER | Refills: 0 | Status: SHIPPED | OUTPATIENT
Start: 2019-10-29

## 2019-10-29 RX ADMIN — DEXAMETHASONE SODIUM PHOSPHATE 10 MG: 10 INJECTION INTRAMUSCULAR; INTRAVENOUS at 12:18

## 2019-10-29 NOTE — PROGRESS NOTES
000SUBJECTIVEBEGIN@  Micaela TEA Reyes is a 59 y.o. female.   Chief Complaint   Patient presents with   • Shortness of Breath      History of Present Illness   Presents with 5 days of productive cough, wheezing, shortness of air and chest pressure. She is taking OTC cough suppressant and cold medication without improvement. She denies fever or chills. She has tan cough expectorant. She has weakness and fatigue. She uses tobacco and has been diagnosed with COPD.   The following portions of the patient's history were reviewed and updated as appropriate: allergies, current medications, past family history, past medical history, past social history, past surgical history and problem list.    Current Outpatient Medications:   •  albuterol sulfate  (90 Base) MCG/ACT inhaler, Inhale 2 puffs Every 4 (Four) Hours As Needed for Wheezing or Shortness of Air. With spacer, Disp: 1 inhaler, Rfl: 0  •  azithromycin (ZITHROMAX) 250 MG tablet, Take 2 tablets the first day, then 1 tablet daily for 4 days., Disp: 6 tablet, Rfl: 0  •  cyclobenzaprine (FLEXERIL) 5 MG tablet, Take 1 tablet by mouth 3 (Three) Times a Day As Needed for Muscle Spasms., Disp: 20 tablet, Rfl: 0  •  guaifenesin-dextromethorphan (MUCINEX DM)  MG tablet sustained-release 12 hour tablet, Take 1 tablet by mouth Every 12 (Twelve) Hours., Disp: 20 tablet, Rfl: 0  •  naproxen (NAPROSYN) 500 MG tablet, Take 1 tablet by mouth 2 (Two) Times a Day As Needed for Moderate Pain ., Disp: 20 tablet, Rfl: 0  No current facility-administered medications for this visit.     Allergies   Allergen Reactions   • Sulfa Antibiotics Itching       Review of Systems   Constitutional: Positive for activity change, appetite change and fatigue. Negative for chills and fever.   HENT: Positive for congestion. Negative for ear discharge, ear pain, postnasal drip, rhinorrhea, sinus pressure, sinus pain, sneezing, sore throat, trouble swallowing and voice change.    Eyes: Negative.   "  Respiratory: Positive for cough, chest tightness, shortness of breath and wheezing.    Cardiovascular: Negative.    Gastrointestinal: Negative.    Genitourinary: Negative.    Musculoskeletal: Negative.    Skin: Negative.    Allergic/Immunologic: Negative.    Neurological: Negative.    Hematological: Negative.    Psychiatric/Behavioral: Negative.        Objective     Visit Vitals  /80   Pulse 85   Temp 97.7 °F (36.5 °C)   Resp 20   Ht 160 cm (63\")   Wt 77.1 kg (170 lb)   LMP  (LMP Unknown)   SpO2 98%   BMI 30.11 kg/m²         Physical Exam   Constitutional: She is oriented to person, place, and time. Vital signs are normal. She appears well-developed and well-nourished. She is cooperative.  Non-toxic appearance. She does not have a sickly appearance. She does not appear ill. No distress.   HENT:   Head: Normocephalic and atraumatic.   Right Ear: Tympanic membrane and external ear normal.   Left Ear: Tympanic membrane and external ear normal.   Nose: Mucosal edema present. No rhinorrhea. Right sinus exhibits no maxillary sinus tenderness and no frontal sinus tenderness. Left sinus exhibits no maxillary sinus tenderness and no frontal sinus tenderness.   Mouth/Throat: Oropharynx is clear and moist and mucous membranes are normal. No oropharyngeal exudate, posterior oropharyngeal edema or posterior oropharyngeal erythema. Tonsils are 0 on the right. Tonsils are 0 on the left. No tonsillar exudate.   Eyes: Conjunctivae are normal.   Neck: Normal range of motion. Neck supple.   Pulmonary/Chest: No respiratory distress. She has wheezes.   Diffuse expiratory wheezing and rhonchi with a congested cough. She has normal sats and respirations.    Abdominal: Soft. Bowel sounds are normal. She exhibits no distension.   Lymphadenopathy:     She has no cervical adenopathy.   Neurological: She is alert and oriented to person, place, and time.   Skin: Skin is warm and dry.   Psychiatric: She has a normal mood and affect. Her " behavior is normal.   Nursing note and vitals reviewed.      Lab Results (last 24 hours)     ** No results found for the last 24 hours. **          Assessment/Plan   Micaela was seen today for shortness of breath.    Diagnoses and all orders for this visit:    Bronchopneumonia  -     azithromycin (ZITHROMAX) 250 MG tablet; Take 2 tablets the first day, then 1 tablet daily for 4 days.  -     albuterol sulfate  (90 Base) MCG/ACT inhaler; Inhale 2 puffs Every 4 (Four) Hours As Needed for Wheezing or Shortness of Air. With spacer  -     guaifenesin-dextromethorphan (MUCINEX DM)  MG tablet sustained-release 12 hour tablet; Take 1 tablet by mouth Every 12 (Twelve) Hours.  -     NC PRESSURIZED/NONPRESSURIZED INHALATION TREATMENT  -     dexamethasone (DECADRON) injection 10 mg      JULIANNE Dalton

## 2019-10-29 NOTE — PATIENT INSTRUCTIONS
Acute Bronchitis, Adult  Acute bronchitis is when air tubes (bronchi) in the lungs suddenly get swollen. The condition can make it hard to breathe. It can also cause these symptoms:  · A cough.  · Coughing up clear, yellow, or green mucus.  · Wheezing.  · Chest congestion.  · Shortness of breath.  · A fever.  · Body aches.  · Chills.  · A sore throat.  Follow these instructions at home:    Medicines  · Take over-the-counter and prescription medicines only as told by your doctor.  · If you were prescribed an antibiotic medicine, take it as told by your doctor. Do not stop taking the antibiotic even if you start to feel better.  General instructions  · Rest.  · Drink enough fluids to keep your pee (urine) pale yellow.  · Avoid smoking and secondhand smoke. If you smoke and you need help quitting, ask your doctor. Quitting will help your lungs heal faster.  · Use an inhaler, cool mist vaporizer, or humidifier as told by your doctor.  · Keep all follow-up visits as told by your doctor. This is important.  How is this prevented?  To lower your risk of getting this condition again:  · Wash your hands often with soap and water. If you cannot use soap and water, use hand .  · Avoid contact with people who have cold symptoms.  · Try not to touch your hands to your mouth, nose, or eyes.  · Make sure to get the flu shot every year.  Contact a doctor if:  · Your symptoms do not get better in 2 weeks.  Get help right away if:  · You cough up blood.  · You have chest pain.  · You have very bad shortness of breath.  · You become dehydrated.  · You faint (pass out) or keep feeling like you are going to pass out.  · You keep throwing up (vomiting).  · You have a very bad headache.  · Your fever or chills gets worse.  This information is not intended to replace advice given to you by your health care provider. Make sure you discuss any questions you have with your health care provider.  Document Released: 06/05/2009 Document  Revised: 08/01/2018 Document Reviewed: 06/07/2017  [employer] Patient Education © 2019 [employer]    Bronchospasm, Adult    Bronchospasm is a tightening of the airways going into the lungs. During an episode, it may be harder to breathe. You may cough, and you may make a whistling sound when you breathe (wheeze).  This condition often affects people with asthma.  What are the causes?  This condition is caused by swelling and irritation in the airways. It can be triggered by:  An infection (common).  Seasonal allergies.  An allergic reaction.  Exercise.  Irritants. These include pollution, cigarette smoke, strong odors, aerosol sprays, and paint fumes.  Weather changes. Winds increase molds and pollens in the air. Cold air may cause swelling.  Stress and emotional upset.  What are the signs or symptoms?  Symptoms of this condition include:  Wheezing. If the episode was triggered by an allergy, wheezing may start right away or hours later.  Nighttime coughing.  Frequent or severe coughing with a simple cold.  Chest tightness.  Shortness of breath.  Decreased ability to exercise.  How is this diagnosed?  This condition is usually diagnosed with a review of your medical history and a physical exam. Tests, such as lung function tests, are sometimes done to look for other conditions. The need for a chest X-ray depends on where the wheezing occurs and whether it is the first time you have wheezed.  How is this treated?  This condition may be treated with:  Inhaled medicines. These open up the airways and help you breathe. They can be taken with an inhaler or a nebulizer device.  Corticosteroid medicines. These may be given for severe bronchospasm, usually when it is associated with asthma.  Avoiding triggers, such as irritants, infection, or allergies.  Follow these instructions at home:  Medicines  Take over-the-counter and prescription medicines only as told by your health care provider.  If you need to use  an inhaler or nebulizer to take your medicine, ask your health care provider to explain how to use it correctly. If you were given a spacer, always use it with your inhaler.  Lifestyle  Reduce the number of triggers in your home. To do this:  Change your heating and air conditioning filter at least once a month.  Limit your use of fireplaces and wood stoves.  Do not smoke. Do not allow smoking in your home.  Avoid using perfumes and fragrances.  Get rid of pests, such as roaches and mice, and their droppings.  Remove any mold from your home.  Keep your house clean and dust free. Use unscented cleaning products.  Replace carpet with wood, tile, or vinyl dmitry. Carpet can trap dander and dust.  Use allergy-proof pillows, mattress covers, and box spring covers.  Wash bed sheets and blankets every week in hot water. Dry them in a dryer.  Use blankets that are made of polyester or cotton.  Wash your hands often.  Do not allow pets in your bedroom.  Avoid breathing in cold air when you exercise.  General instructions  Have a plan for seeking medical care. Know when to call your health care provider and local emergency services, and where to get emergency care.  Stay up to date on your immunizations.  When you have an episode of bronchospasm, stay calm. Try to relax and breathe more slowly.  If you have asthma, make sure you have an asthma action plan.  Keep all follow-up visits as told by your health care provider. This is important.  Contact a health care provider if:  You have muscle aches.  You have chest pain.  The mucus that you cough up (sputum) changes from clear or white to yellow, green, gray, or bloody.  You have a fever.  Your sputum gets thicker.  Get help right away if:  Your wheezing and coughing get worse, even after you take your prescribed medicines.  It gets even harder to breathe.  You develop severe chest pain.  Summary  Bronchospasm is a tightening of the airways going into the lungs.  During an  episode of bronchospasm, you may have a harder time breathing. You may cough and make a whistling sound when you breathe (wheeze).  Avoid exposure to triggers such as smoke, dust, mold, animal dander, and fragrances.  When you have an episode of bronchospasm, stay calm. Try to relax and breathe more slowly.  This information is not intended to replace advice given to you by your health care provider. Make sure you discuss any questions you have with your health care provider.  Document Released: 12/20/2004 Document Revised: 12/14/2017 Document Reviewed: 12/14/2017  [employer] Interactive Patient Education © 2019 [employer]

## 2023-08-02 ENCOUNTER — APPOINTMENT (OUTPATIENT)
Dept: CT IMAGING | Facility: HOSPITAL | Age: 63
End: 2023-08-02
Payer: COMMERCIAL

## 2023-08-02 ENCOUNTER — HOSPITAL ENCOUNTER (EMERGENCY)
Facility: HOSPITAL | Age: 63
Discharge: HOME | End: 2023-08-02
Attending: EMERGENCY MEDICINE | Admitting: EMERGENCY MEDICINE
Payer: COMMERCIAL

## 2023-08-02 ENCOUNTER — APPOINTMENT (OUTPATIENT)
Dept: GENERAL RADIOLOGY | Facility: HOSPITAL | Age: 63
End: 2023-08-02
Payer: COMMERCIAL

## 2023-08-02 VITALS
RESPIRATION RATE: 16 BRPM | WEIGHT: 162 LBS | BODY MASS INDEX: 28.7 KG/M2 | SYSTOLIC BLOOD PRESSURE: 125 MMHG | HEART RATE: 66 BPM | TEMPERATURE: 98.2 F | OXYGEN SATURATION: 98 % | DIASTOLIC BLOOD PRESSURE: 69 MMHG | HEIGHT: 63 IN

## 2023-08-02 DIAGNOSIS — R73.9 HYPERGLYCEMIA: ICD-10-CM

## 2023-08-02 DIAGNOSIS — R51.9 NONINTRACTABLE EPISODIC HEADACHE, UNSPECIFIED HEADACHE TYPE: Primary | ICD-10-CM

## 2023-08-02 DIAGNOSIS — R20.2 PARESTHESIA: ICD-10-CM

## 2023-08-02 DIAGNOSIS — J98.8 CONGESTION OF UPPER AIRWAY: ICD-10-CM

## 2023-08-02 LAB
ALBUMIN SERPL-MCNC: 4.3 G/DL (ref 3.5–5.2)
ALBUMIN/GLOB SERPL: 1.8 G/DL
ALP SERPL-CCNC: 113 U/L (ref 39–117)
ALT SERPL W P-5'-P-CCNC: 30 U/L (ref 1–33)
ANION GAP SERPL CALCULATED.3IONS-SCNC: 15 MMOL/L (ref 5–15)
AST SERPL-CCNC: 23 U/L (ref 1–32)
BASOPHILS # BLD AUTO: 0.03 10*3/MM3 (ref 0–0.2)
BASOPHILS NFR BLD AUTO: 0.4 % (ref 0–1.5)
BILIRUB SERPL-MCNC: <0.2 MG/DL (ref 0–1.2)
BUN SERPL-MCNC: 17 MG/DL (ref 8–23)
BUN/CREAT SERPL: 21.8 (ref 7–25)
CALCIUM SPEC-SCNC: 8.8 MG/DL (ref 8.6–10.5)
CHLORIDE SERPL-SCNC: 103 MMOL/L (ref 98–107)
CO2 SERPL-SCNC: 19 MMOL/L (ref 22–29)
CREAT SERPL-MCNC: 0.78 MG/DL (ref 0.57–1)
DEPRECATED RDW RBC AUTO: 46.5 FL (ref 37–54)
EGFRCR SERPLBLD CKD-EPI 2021: 87.1 ML/MIN/1.73
EOSINOPHIL # BLD AUTO: 0.21 10*3/MM3 (ref 0–0.4)
EOSINOPHIL NFR BLD AUTO: 2.9 % (ref 0.3–6.2)
ERYTHROCYTE [DISTWIDTH] IN BLOOD BY AUTOMATED COUNT: 12.5 % (ref 12.3–15.4)
GLOBULIN UR ELPH-MCNC: 2.4 GM/DL
GLUCOSE SERPL-MCNC: 192 MG/DL (ref 65–99)
HCT VFR BLD AUTO: 42.1 % (ref 34–46.6)
HGB BLD-MCNC: 13.9 G/DL (ref 12–15.9)
IMM GRANULOCYTES # BLD AUTO: 0.05 10*3/MM3 (ref 0–0.05)
IMM GRANULOCYTES NFR BLD AUTO: 0.7 % (ref 0–0.5)
LYMPHOCYTES # BLD AUTO: 1.3 10*3/MM3 (ref 0.7–3.1)
LYMPHOCYTES NFR BLD AUTO: 18.2 % (ref 19.6–45.3)
MCH RBC QN AUTO: 33.3 PG (ref 26.6–33)
MCHC RBC AUTO-ENTMCNC: 33 G/DL (ref 31.5–35.7)
MCV RBC AUTO: 100.7 FL (ref 79–97)
MONOCYTES # BLD AUTO: 0.49 10*3/MM3 (ref 0.1–0.9)
MONOCYTES NFR BLD AUTO: 6.9 % (ref 5–12)
NEUTROPHILS NFR BLD AUTO: 5.05 10*3/MM3 (ref 1.7–7)
NEUTROPHILS NFR BLD AUTO: 70.9 % (ref 42.7–76)
NRBC BLD AUTO-RTO: 0 /100 WBC (ref 0–0.2)
PLATELET # BLD AUTO: 196 10*3/MM3 (ref 140–450)
PMV BLD AUTO: 10.3 FL (ref 6–12)
POTASSIUM SERPL-SCNC: 4.4 MMOL/L (ref 3.5–5.2)
PROT SERPL-MCNC: 6.7 G/DL (ref 6–8.5)
RBC # BLD AUTO: 4.18 10*6/MM3 (ref 3.77–5.28)
SODIUM SERPL-SCNC: 137 MMOL/L (ref 136–145)
WBC NRBC COR # BLD: 7.13 10*3/MM3 (ref 3.4–10.8)

## 2023-08-02 PROCEDURE — 85025 COMPLETE CBC W/AUTO DIFF WBC: CPT | Performed by: EMERGENCY MEDICINE

## 2023-08-02 PROCEDURE — 80053 COMPREHEN METABOLIC PANEL: CPT | Performed by: EMERGENCY MEDICINE

## 2023-08-02 PROCEDURE — 25010000002 HYDROMORPHONE PER 4 MG: Performed by: EMERGENCY MEDICINE

## 2023-08-02 PROCEDURE — 94799 UNLISTED PULMONARY SVC/PX: CPT

## 2023-08-02 PROCEDURE — 25010000002 ONDANSETRON PER 1 MG: Performed by: EMERGENCY MEDICINE

## 2023-08-02 PROCEDURE — 96361 HYDRATE IV INFUSION ADD-ON: CPT

## 2023-08-02 PROCEDURE — 70450 CT HEAD/BRAIN W/O DYE: CPT

## 2023-08-02 PROCEDURE — 99284 EMERGENCY DEPT VISIT MOD MDM: CPT

## 2023-08-02 PROCEDURE — 96375 TX/PRO/DX INJ NEW DRUG ADDON: CPT

## 2023-08-02 PROCEDURE — 96374 THER/PROPH/DIAG INJ IV PUSH: CPT

## 2023-08-02 PROCEDURE — 71045 X-RAY EXAM CHEST 1 VIEW: CPT

## 2023-08-02 RX ORDER — CHLORAL HYDRATE 500 MG
CAPSULE ORAL
COMMUNITY

## 2023-08-02 RX ORDER — IPRATROPIUM BROMIDE AND ALBUTEROL SULFATE 2.5; .5 MG/3ML; MG/3ML
3 SOLUTION RESPIRATORY (INHALATION)
Status: DISCONTINUED | OUTPATIENT
Start: 2023-08-02 | End: 2023-08-02 | Stop reason: HOSPADM

## 2023-08-02 RX ORDER — ATORVASTATIN CALCIUM 20 MG/1
20 TABLET, FILM COATED ORAL DAILY
COMMUNITY

## 2023-08-02 RX ORDER — HYDROMORPHONE HYDROCHLORIDE 1 MG/ML
0.5 INJECTION, SOLUTION INTRAMUSCULAR; INTRAVENOUS; SUBCUTANEOUS ONCE
Status: COMPLETED | OUTPATIENT
Start: 2023-08-02 | End: 2023-08-02

## 2023-08-02 RX ORDER — OMEPRAZOLE 20 MG/1
20 CAPSULE, DELAYED RELEASE ORAL DAILY
COMMUNITY

## 2023-08-02 RX ORDER — ONDANSETRON 2 MG/ML
4 INJECTION INTRAMUSCULAR; INTRAVENOUS ONCE
Status: COMPLETED | OUTPATIENT
Start: 2023-08-02 | End: 2023-08-02

## 2023-08-02 RX ORDER — PROMETHAZINE HYDROCHLORIDE AND CODEINE PHOSPHATE 6.25; 1 MG/5ML; MG/5ML
5 SYRUP ORAL EVERY 4 HOURS PRN
Qty: 15 ML | Refills: 0 | Status: SHIPPED | OUTPATIENT
Start: 2023-08-02

## 2023-08-02 RX ORDER — LISINOPRIL 20 MG/1
20 TABLET ORAL DAILY
COMMUNITY

## 2023-08-02 RX ORDER — ASPIRIN 81 MG/1
81 TABLET ORAL DAILY
COMMUNITY

## 2023-08-02 RX ORDER — BUSPIRONE HYDROCHLORIDE 15 MG/1
15 TABLET ORAL 3 TIMES DAILY
COMMUNITY

## 2023-08-02 RX ADMIN — HYDROMORPHONE HYDROCHLORIDE 0.5 MG: 1 INJECTION, SOLUTION INTRAMUSCULAR; INTRAVENOUS; SUBCUTANEOUS at 13:14

## 2023-08-02 RX ADMIN — ONDANSETRON 4 MG: 2 INJECTION INTRAMUSCULAR; INTRAVENOUS at 13:40

## 2023-08-02 RX ADMIN — SODIUM CHLORIDE 500 ML: 9 INJECTION, SOLUTION INTRAVENOUS at 11:23

## 2023-08-02 RX ADMIN — IPRATROPIUM BROMIDE AND ALBUTEROL SULFATE 3 ML: 2.5; .5 SOLUTION RESPIRATORY (INHALATION) at 11:24

## 2023-08-02 NOTE — DISCHARGE INSTRUCTIONS
Drink plenty fluids.    Over-the-counter Mucinex and Afrin as we discussed.    Mandatory follow-up with your PCP this week for reexamination as we discussed.  Call for an appointment.    Return to the Emergency Department immediately for new or change concerns.    Please review the medications you are supposed to be taking according to prior physician recommendations. I have not changed your home medications during this visit. If your discharge instructions indicate that I have changed your home medications, this is not the case, and you should disregard. If you have any questions about the medication you should be taking at home, please call your physician.

## 2023-08-02 NOTE — ED PROVIDER NOTES
"Subjective   History of Present Illness  This patient is a pleasant 62-year-old  female who comes in with complaints of a headache x5 days.  Patient was sent by her PMD for a CT scan of the head according to the patient.  Patient has a history of anxiety, COPD, hypertension and dyslipidemia.  She tells me she has had a \"pressure and fullness like sensation\" in her head over the last 5 straight days.  Nothing specifically makes it better or worse.  She feels like she is congested as well.  She has had a cough but tells me this is normal for her secondary to her COPD.  She also tells me that over the last couple of days, she has had some \"burning\" in all 4 extremities.  She tells me she feels like something is going up and down her veins.  She has no history of stroke.  No history of bleed.  No history of neck trauma.  Denies any weakness.  Daughter is here and indicates that the patient always sounds hoarse and is if she is \"congested.\"  Patient denies fevers chills, abdominal pain, nausea or vomiting.  Reports no specific history of migraine headaches.  No diagnosis of sinusitis.  No vision changes today.  She is pleasant, oriented x4 and baseline with regard to mental status according to daughter.    Past medical history  COPD, anxiety, hypertension, dyslipidemia and depression.  Negative for CAD or CVA    Family history  Cancer in mother and father    Review of Systems   Constitutional: Negative.  Negative for chills, fatigue, fever and unexpected weight change.   HENT:  Positive for congestion, sinus pressure and sinus pain. Negative for dental problem, ear pain and hearing loss.    Eyes:  Negative for pain and visual disturbance.   Respiratory:  Negative for chest tightness and shortness of breath.    Cardiovascular:  Negative for chest pain, palpitations and leg swelling.   Gastrointestinal:  Negative for blood in stool, diarrhea, nausea and vomiting.   Genitourinary:  Negative for difficulty " urinating, dysuria, frequency, hematuria and urgency.   Musculoskeletal:  Negative for myalgias, neck pain and neck stiffness.   Neurological:  Positive for headaches. Negative for seizures, syncope, speech difficulty and light-headedness.   Psychiatric/Behavioral:  Negative for confusion.    All other systems reviewed and are negative.    Past Medical History:   Diagnosis Date    Anxiety     COPD (chronic obstructive pulmonary disease)     Depression     Hypertension     Urinary tract infection        Allergies   Allergen Reactions    Sulfa Antibiotics Itching       Past Surgical History:   Procedure Laterality Date    TUBAL ABDOMINAL LIGATION         Family History   Problem Relation Age of Onset    Cancer Mother     Cancer Father     Cancer Maternal Aunt     Cancer Maternal Uncle     Cancer Maternal Grandmother     Cancer Maternal Grandfather     Cancer Paternal Grandmother     Cancer Paternal Grandfather        Social History     Socioeconomic History    Marital status: Single   Tobacco Use    Smoking status: Some Days     Packs/day: 1.00     Types: Cigarettes    Smokeless tobacco: Never   Substance and Sexual Activity    Alcohol use: Yes     Comment: ocassionally    Drug use: No    Sexual activity: Defer     Birth control/protection: Post-menopausal           Objective   Physical Exam  Vitals and nursing note reviewed.   Constitutional:       General: She is not in acute distress.     Appearance: She is well-developed. She is not toxic-appearing.   HENT:      Head: Normocephalic and atraumatic.      Jaw: No trismus.      Right Ear: Tympanic membrane, ear canal and external ear normal.      Left Ear: Tympanic membrane, ear canal and external ear normal.      Nose: Nose normal.      Comments: Mild tenderness palpation over the frontal and maxillary sinus areas.     Mouth/Throat:      Mouth: Mucous membranes are moist. Mucous membranes are not dry. No oral lesions.      Dentition: No dental abscesses.       Pharynx: Oropharynx is clear. No posterior oropharyngeal erythema or uvula swelling.      Tonsils: No tonsillar exudate or tonsillar abscesses.   Eyes:      Conjunctiva/sclera:      Right eye: Right conjunctiva is not injected.      Left eye: Left conjunctiva is not injected.      Pupils: Pupils are equal, round, and reactive to light.   Neck:      Vascular: No JVD.      Trachea: No tracheal tenderness.   Cardiovascular:      Rate and Rhythm: Normal rate and regular rhythm.      Heart sounds: Normal heart sounds.     No friction rub. No gallop.   Pulmonary:      Effort: Pulmonary effort is normal.      Breath sounds: Normal breath sounds. No wheezing or rales.   Chest:      Chest wall: No tenderness.   Abdominal:      General: Bowel sounds are normal. There is no distension.      Palpations: Abdomen is soft. Abdomen is not rigid. There is no mass or pulsatile mass.      Tenderness: There is no abdominal tenderness. There is no guarding or rebound. Negative signs include McBurney's sign.      Comments: No signs of acute abdomen.  No pain at McBurney's point.  No pulsatile abdominal mass.   Musculoskeletal:         General: No tenderness or deformity. Normal range of motion.      Cervical back: Normal range of motion and neck supple. No rigidity. Normal range of motion.   Lymphadenopathy:      Cervical: No cervical adenopathy.   Skin:     General: Skin is warm and dry.      Capillary Refill: Capillary refill takes less than 2 seconds.      Findings: No erythema or rash.      Comments: No diaphoresis, lesions, nevi, petechia, purpura   Neurological:      Mental Status: She is alert and oriented to person, place, and time.      Cranial Nerves: No cranial nerve deficit.      Sensory: No sensory deficit.      Motor: No tremor or abnormal muscle tone.      Comments: 5/5 strength bilaterally with flexion and extension of fingers, wrist, elbows, knees and hips as well as plantar and dorsiflexion of the foot.   Psychiatric:    "      Attention and Perception: She is attentive.         Speech: Speech normal.         Behavior: Behavior normal.         Thought Content: Thought content normal.         Judgment: Judgment normal.       Procedures           ED Course  ED Course as of 08/02/23 1542   Wed Aug 02, 2023   1059 I had a good conversation with the patient and her daughter.  I told him that we would take good care of the patient.  We talked about the differential diagnosis and medical decision making.  I think stroke is relatively unlikely.  Patient has no unilateral symptoms.  She has a \"burning\" in all 4 extremities and a \"pressure\" in her head.  Patient seems to be very congested and also has a mild cough and wheezing that I think is likely chronic.  We will give her a breathing treatment to see if this opens up a little bit.  We have talked about over-the-counter Mucinex and Afrin if the patient is able to go home.  We talked about stroke, both hemorrhagic and ischemic.  We will order electrolytes as well.  We talked about neuropathic pain.  Patient is resting comfortably currently.  CT of the head, chest x-ray and labs pending.  I do anticipate discharge home.  Final impression and plan following completion of work-up. [BERNADETTE]   1320 On reexamination, patient is resting comfortably.  Pain medication has helped the head discomfort but she is having some nausea and I have ordered a dose of Zofran.  She feels comfortable with plan for discharge.  We will prescribe her some education for congestion and have the patient follow-up with her PCP.  She tells me her doctor has already let the patient know that she should call for follow-up appointment.  We have recommended outpatient neurology follow-up.  I told her to come back immediately for new or changing concerns and she has promised to do so. [BERNADETTE]      ED Course User Index  [BERNADETTE] Lucio Brambila MD     Recent Results (from the past 24 hour(s))   Comprehensive Metabolic Panel    " Collection Time: 08/02/23 11:23 AM    Specimen: Blood   Result Value Ref Range    Glucose 192 (H) 65 - 99 mg/dL    BUN 17 8 - 23 mg/dL    Creatinine 0.78 0.57 - 1.00 mg/dL    Sodium 137 136 - 145 mmol/L    Potassium 4.4 3.5 - 5.2 mmol/L    Chloride 103 98 - 107 mmol/L    CO2 19.0 (L) 22.0 - 29.0 mmol/L    Calcium 8.8 8.6 - 10.5 mg/dL    Total Protein 6.7 6.0 - 8.5 g/dL    Albumin 4.3 3.5 - 5.2 g/dL    ALT (SGPT) 30 1 - 33 U/L    AST (SGOT) 23 1 - 32 U/L    Alkaline Phosphatase 113 39 - 117 U/L    Total Bilirubin <0.2 0.0 - 1.2 mg/dL    Globulin 2.4 gm/dL    A/G Ratio 1.8 g/dL    BUN/Creatinine Ratio 21.8 7.0 - 25.0    Anion Gap 15.0 5.0 - 15.0 mmol/L    eGFR 87.1 >60.0 mL/min/1.73   CBC Auto Differential    Collection Time: 08/02/23 11:23 AM    Specimen: Blood   Result Value Ref Range    WBC 7.13 3.40 - 10.80 10*3/mm3    RBC 4.18 3.77 - 5.28 10*6/mm3    Hemoglobin 13.9 12.0 - 15.9 g/dL    Hematocrit 42.1 34.0 - 46.6 %    .7 (H) 79.0 - 97.0 fL    MCH 33.3 (H) 26.6 - 33.0 pg    MCHC 33.0 31.5 - 35.7 g/dL    RDW 12.5 12.3 - 15.4 %    RDW-SD 46.5 37.0 - 54.0 fl    MPV 10.3 6.0 - 12.0 fL    Platelets 196 140 - 450 10*3/mm3    Neutrophil % 70.9 42.7 - 76.0 %    Lymphocyte % 18.2 (L) 19.6 - 45.3 %    Monocyte % 6.9 5.0 - 12.0 %    Eosinophil % 2.9 0.3 - 6.2 %    Basophil % 0.4 0.0 - 1.5 %    Immature Grans % 0.7 (H) 0.0 - 0.5 %    Neutrophils, Absolute 5.05 1.70 - 7.00 10*3/mm3    Lymphocytes, Absolute 1.30 0.70 - 3.10 10*3/mm3    Monocytes, Absolute 0.49 0.10 - 0.90 10*3/mm3    Eosinophils, Absolute 0.21 0.00 - 0.40 10*3/mm3    Basophils, Absolute 0.03 0.00 - 0.20 10*3/mm3    Immature Grans, Absolute 0.05 0.00 - 0.05 10*3/mm3    nRBC 0.0 0.0 - 0.2 /100 WBC     Note: In addition to lab results from this visit, the labs listed above may include labs taken at another facility or during a different encounter within the last 24 hours. Please correlate lab times with ED admission and discharge times for further  clarification of the services performed during this visit.    CT Head Without Contrast   Final Result   Impression:   No evidence of acute intracranial hemorrhage or large territory infarct.            Electronically Signed: Bello Razo     8/2/2023 12:19 PM EDT     Workstation ID: [id number]      XR Chest 1 View   Final Result   No evidence of acute disease in the chest.       Electronically Signed: Guido Smyth     8/2/2023 12:13 PM EDT     Workstation ID: [id number]        Vitals:    08/02/23 1130 08/02/23 1230 08/02/23 1300 08/02/23 1315   BP: 128/60 116/51 125/69 125/69   Pulse: 68 66 67 66   Resp:    16   Temp:       TempSrc:       SpO2: 98% 97% 96% 98%   Weight:       Height:         Medications   ipratropium-albuterol (DUO-NEB) nebulizer solution 3 mL ( Nebulization Canceled Entry 8/2/23 1230)   sodium chloride 0.9 % bolus 500 mL (0 mL Intravenous Stopped 8/2/23 1225)   HYDROmorphone (DILAUDID) injection 0.5 mg (0.5 mg Intravenous Given 8/2/23 1314)   ondansetron (ZOFRAN) injection 4 mg (4 mg Intravenous Given 8/2/23 1340)     ECG/EMG Results (last 24 hours)       ** No results found for the last 24 hours. **          No orders to display                                               Medical Decision Making  Less consider ischemic and hemorrhagic bleed.  Must consider sinus infection.  Peripheral neuropathy, electrolyte abnormality, anemia and other etiologies considered.  Patient has symmetric symptoms in all 4 extremities.  I think stroke is very unlikely.  Patient is alert and oriented x4 and with 5 days of symptoms, I think a CT head is in a reasonable next step.  We have talked about the potential need for further work-up with PCP and potentially with neurology as an outpatient.  I told her that an outpatient MRI may be part of the work-up that she could expect.  We have also talked about outpatient over-the-counter medications including but not limited to Mucinex and Afrin.  I do anticipate discharge  home.    Problems Addressed:  Congestion of upper airway: complicated acute illness or injury  Hyperglycemia: complicated acute illness or injury  Nonintractable episodic headache, unspecified headache type: complicated acute illness or injury  Paresthesia: complicated acute illness or injury    Amount and/or Complexity of Data Reviewed  External Data Reviewed: notes.  Labs: ordered. Decision-making details documented in ED Course.  Radiology: ordered. Decision-making details documented in ED Course.  ECG/medicine tests:  Decision-making details documented in ED Course.    Risk  Prescription drug management.        Final diagnoses:   Nonintractable episodic headache, unspecified headache type   Paresthesia   Hyperglycemia   Congestion of upper airway       ED Disposition  ED Disposition       ED Disposition   Discharge    Condition   Stable    Comment   --               Delma Cisneros, APRN  [address] [id number] [phone]    In 1 week           Medication List        New Prescriptions      promethazine-codeine 6.25-10 MG/5ML syrup  Commonly known as: PHENERGAN with CODEINE  Take 5 mL by mouth Every 4 (Four) Hours As Needed for Cough.               Where to Get Your Medications        These medications were sent to [facility] #[id number] - [place], [place] - [address] [facility] [facility] AT [facility] - [phone]  - [phone]   [address] [id number]      Phone: [phone]   promethazine-codeine 6.25-10 MG/5ML syrup            Lucio Brambila MD  08/02/23 2595